# Patient Record
Sex: FEMALE | Race: WHITE | NOT HISPANIC OR LATINO | Employment: PART TIME | URBAN - METROPOLITAN AREA
[De-identification: names, ages, dates, MRNs, and addresses within clinical notes are randomized per-mention and may not be internally consistent; named-entity substitution may affect disease eponyms.]

---

## 2020-06-10 ENCOUNTER — TRANSCRIBE ORDERS (OUTPATIENT)
Dept: PERINATAL CARE | Facility: CLINIC | Age: 35
End: 2020-06-10

## 2020-06-10 DIAGNOSIS — O09.899 SUPERVISION OF OTHER HIGH RISK PREGNANCIES, UNSPECIFIED TRIMESTER: Primary | ICD-10-CM

## 2020-06-18 ENCOUNTER — TELEPHONE (OUTPATIENT)
Dept: PERINATAL CARE | Facility: CLINIC | Age: 35
End: 2020-06-18

## 2020-06-18 ENCOUNTER — TELEPHONE (OUTPATIENT)
Dept: PERINATAL CARE | Facility: OTHER | Age: 35
End: 2020-06-18

## 2020-07-20 RX ORDER — LORATADINE 10 MG/1
10 TABLET ORAL DAILY
COMMUNITY
End: 2020-09-23 | Stop reason: ALTCHOICE

## 2020-07-20 RX ORDER — NIFEDIPINE 30 MG/1
30 TABLET, FILM COATED, EXTENDED RELEASE ORAL DAILY
COMMUNITY
Start: 2020-06-11 | End: 2021-04-28 | Stop reason: ALTCHOICE

## 2020-07-20 RX ORDER — MAG HYDROX/ALUMINUM HYD/SIMETH 400-400-40
125 SUSPENSION, ORAL (FINAL DOSE FORM) ORAL DAILY
COMMUNITY

## 2020-07-21 ENCOUNTER — TELEPHONE (OUTPATIENT)
Dept: PERINATAL CARE | Facility: CLINIC | Age: 35
End: 2020-07-21

## 2020-07-21 NOTE — TELEPHONE ENCOUNTER
Attempted to reach patient by phone and left voicemail to confirm appointment for MFM ultrasound  1 support person ( must be over the age of 15) may accompany you for your appointment  If you or your support person have traveled outside the state in the past 2 weeks, please call and notify our office today #[phone]  You and your support person must wear a mask ,covering nose and mouth,during your entire visit  You and your support person will have temperature screened upon arrival     Please call our office prior to entering the building  Check in and rooming questions will be done via phone  We will give you directions when to enter for your appointment  Inside office # provided:  [facility] line: [phone]  [place] line:  [phone]  [place]  line:  [phone] line:  [phone]  [place]ri[place] Bear line:  [phone]  [place] line:  [phone]    [facility] does not allow cell phone use, recording device or streaming during ultrasound  IF you are not feeling well- cough, fever, shortness of breath or any flu like symptoms, contact your primary care physician or [address] [place]    Any questions with these instructions please call Maternal Fetal Medicine nurse line today @ # [phone]

## 2020-07-22 ENCOUNTER — ROUTINE PRENATAL (OUTPATIENT)
Dept: PERINATAL CARE | Facility: CLINIC | Age: 35
End: 2020-07-22
Payer: COMMERCIAL

## 2020-07-22 VITALS
SYSTOLIC BLOOD PRESSURE: 136 MMHG | HEART RATE: 85 BPM | HEIGHT: 68 IN | WEIGHT: 146 LBS | BODY MASS INDEX: 22.13 KG/M2 | TEMPERATURE: 99 F | DIASTOLIC BLOOD PRESSURE: 88 MMHG

## 2020-07-22 DIAGNOSIS — O10.912 CHRONIC HYPERTENSION IN OBSTETRIC CONTEXT IN SECOND TRIMESTER: Primary | ICD-10-CM

## 2020-07-22 DIAGNOSIS — O09.522 MULTIGRAVIDA OF ADVANCED MATERNAL AGE IN SECOND TRIMESTER: ICD-10-CM

## 2020-07-22 DIAGNOSIS — O09.899 SUPERVISION OF OTHER HIGH RISK PREGNANCIES, UNSPECIFIED TRIMESTER: ICD-10-CM

## 2020-07-22 DIAGNOSIS — O44.42 LOW LYING PLACENTA NOS OR WITHOUT HEMORRHAGE, SECOND TRIMESTER: ICD-10-CM

## 2020-07-22 DIAGNOSIS — Z3A.19 19 WEEKS GESTATION OF PREGNANCY: ICD-10-CM

## 2020-07-22 PROCEDURE — 76811 OB US DETAILED SNGL FETUS: CPT | Performed by: OBSTETRICS & GYNECOLOGY

## 2020-07-22 PROCEDURE — 99202 OFFICE O/P NEW SF 15 MIN: CPT | Performed by: OBSTETRICS & GYNECOLOGY

## 2020-07-22 PROCEDURE — 76817 TRANSVAGINAL US OBSTETRIC: CPT | Performed by: OBSTETRICS & GYNECOLOGY

## 2020-07-22 RX ORDER — ASPIRIN 81 MG/1
162 TABLET ORAL DAILY
Qty: 180 TABLET | Refills: 3 | Status: SHIPPED | OUTPATIENT
Start: 2020-07-22 | End: 2020-08-11 | Stop reason: ALTCHOICE

## 2020-07-22 NOTE — PROGRESS NOTES
The patient was seen today for an ultrasound  Please see ultrasound report (located under Ob Procedures) for additional details  Thank you very much for allowing us to participate in the care of this very nice patient  Should you have any questions, please do not hesitate to contact me  Brendon Robertson MD [address]  Attending Physician, Marzena

## 2020-07-22 NOTE — PROGRESS NOTES
A transvaginal ultrasound was performed  Sonographer note on use of High Level Disinfection Process (Trophon) for transvaginal probe# 2 used, serial # [id number]     Josephine Sandra, DESEANMS

## 2020-08-11 ENCOUNTER — ANESTHESIA EVENT (OUTPATIENT)
Dept: PERIOP | Facility: HOSPITAL | Age: 35
End: 2020-08-11
Payer: COMMERCIAL

## 2020-08-11 ENCOUNTER — TELEPHONE (OUTPATIENT)
Dept: PERINATAL CARE | Facility: CLINIC | Age: 35
End: 2020-08-11

## 2020-08-11 ENCOUNTER — PROCEDURE VISIT (OUTPATIENT)
Dept: PERINATAL CARE | Facility: OTHER | Age: 35
End: 2020-08-11
Payer: COMMERCIAL

## 2020-08-11 VITALS
DIASTOLIC BLOOD PRESSURE: 102 MMHG | HEART RATE: 69 BPM | BODY MASS INDEX: 21.95 KG/M2 | TEMPERATURE: 99.5 F | HEIGHT: 68 IN | SYSTOLIC BLOOD PRESSURE: 159 MMHG | WEIGHT: 144.84 LBS

## 2020-08-11 DIAGNOSIS — Z3A.22 22 WEEKS GESTATION OF PREGNANCY: ICD-10-CM

## 2020-08-11 DIAGNOSIS — O09.522 MULTIGRAVIDA OF ADVANCED MATERNAL AGE IN SECOND TRIMESTER: ICD-10-CM

## 2020-08-11 DIAGNOSIS — Z01.818 ENCOUNTER FOR PREOPERATIVE EXAMINATION FOR GENERAL SURGICAL PROCEDURE: ICD-10-CM

## 2020-08-11 DIAGNOSIS — O10.912 CHRONIC HYPERTENSION IN OBSTETRIC CONTEXT IN SECOND TRIMESTER: ICD-10-CM

## 2020-08-11 DIAGNOSIS — O36.4XX0 FETAL DEMISE, GREATER THAN 22 WEEKS, ANTEPARTUM, SINGLE OR UNSPECIFIED FETUS: Primary | ICD-10-CM

## 2020-08-11 DIAGNOSIS — O26.20 PREVIOUS RECURRENT MISCARRIAGES AFFECTING PREGNANCY, ANTEPARTUM: ICD-10-CM

## 2020-08-11 PROCEDURE — 59200 INSERT CERVICAL DILATOR: CPT | Performed by: OBSTETRICS & GYNECOLOGY

## 2020-08-11 PROCEDURE — 76816 OB US FOLLOW-UP PER FETUS: CPT | Performed by: OBSTETRICS & GYNECOLOGY

## 2020-08-11 PROCEDURE — 99024 POSTOP FOLLOW-UP VISIT: CPT | Performed by: OBSTETRICS & GYNECOLOGY

## 2020-08-11 RX ORDER — DOXYCYCLINE HYCLATE 100 MG/1
100 CAPSULE ORAL EVERY 12 HOURS SCHEDULED
Qty: 2 CAPSULE | Refills: 0 | Status: SHIPPED | OUTPATIENT
Start: 2020-08-11 | End: 2020-08-13 | Stop reason: HOSPADM

## 2020-08-11 RX ORDER — MISOPROSTOL 100 UG/1
800 TABLET ORAL ONCE
Status: CANCELLED | OUTPATIENT
Start: 2020-08-11

## 2020-08-11 NOTE — LETTER
2020     MD Jaiden Mercerja [address] [facility] [address]  [facility] [address]    Patient: Daron Fairbanks   YOB: 1985   Date of Visit: 2020       Dear Dr Jael Claire Md:    Thank you for referring Daron Fairbanks to me for evaluation  Below are my notes for this consultation  If you have questions, please do not hesitate to call me  I look forward to following your patient along with you  Sincerely,        Marylen Bryant, MD        CC: Leon Blumenthal, MD Marylen Bryant, MD  2020  7:52 PM  Incomplete  PREPROCEDURE H&P: MATERNAL-FETAL MEDICINE    Daron Fairbanks is a 29y o  year-old para [id number] at 22w4d here with chief complaint of preprocedure History and Physical Examination prior to planned laminaria today and D/E tomorrow  History of Present Illness as it relates to this planned procedure is that she was time diagnosed with an IUFD at 22 weeks and 4 days that was similar in size to the findings at her 19 week ultrasound  The timing of her demise is not known but looks recent  The fetus appeared edematous which is normal in a fetal demise  The cranial bones were not over lapping  The henry appears normal   The placenta is posterior  [place]   Her history is as follows:    Past Medical History:   Diagnosis Date    Abnormal Pap smear of cervix     ASC-H w/ +HPV    HTN (hypertension)      Past Surgical History:   Procedure Laterality Date    DILATION AND EVACUATION      15 week IUFD     OB History    Para Term  AB Living   4 2 2 0 1 2   SAB TAB Ectopic Multiple Live Births   1 0 0 0 2      # Outcome Date GA Lbr Andrés/2nd Weight Sex Delivery Anes PTL Lv   4 Current            3 SAB  14w2d             Birth Comments: missed ab and needed D/E   2 Term 16/17 37w0d  2381 g (5 lb 4 oz) M Vag-Spont  N CHRISTIANNE   1 Term 11/10/11 40w5d  3487 g (7 lb 11 oz) M Vag-Spont  N CHRISTIANNE      Obstetric Comments   IUFD @ 13 weeks Karyotype did not grow     Social History     Tobacco Use Smoking Status Never Smoker   Smokeless Tobacco Never Used      Social History     Substance and Sexual Activity   Alcohol Use Not Currently      Social History     Substance and Sexual Activity   Drug Use Never        Current Outpatient Medications:     NIFEdipine ER (ADALAT CC) 30 MG 24 hr tablet, Take 30 mg by mouth daily On empty stomach, Disp: , Rfl:     Prenatal Vit w/Ow-Dpollqdzx-KN (PNV PO), Take 1 tablet by mouth daily, Disp: , Rfl:     aspirin (ECOTRIN LOW STRENGTH) 81 mg EC tablet, Take 2 tablets (162 mg total) by mouth daily (Patient not taking: Reported on 8/11/2020), Disp: 180 tablet, Rfl: 3    Cholecalciferol (VITAMIN D3) 125 MCG (5000 UT) CAPS, Take 125 mcg by mouth daily, Disp: , Rfl:     doxycycline hyclate (VIBRAMYCIN) 100 mg capsule, Take 1 capsule (100 mg total) by mouth every 12 (twelve) hours for 2 doses, Disp: 2 capsule, Rfl: 0    loratadine (CLARITIN) 10 mg tablet, Take 10 mg by mouth daily, Disp: , Rfl:   Allergies   Allergen Reactions    Iodine Anaphylaxis    Doxycycline GI Intolerance       Examination:  Vitals: Blood pressure (!) 159/102, pulse 69, temperature 99 5 °F (37 5 °C), temperature source Tympanic, height 5' 8" (1 727 m), weight 65 7 kg (144 lb 13 5 oz), last menstrual period 02/28/2020  General appearance: alert, well appearing, and in no distress  Pulmonary exam: clear to auscultation, no wheezes, rales or rhonchi, symmetric air entry  Cardiovascular exam: normal rate, regular rhythm, normal S1, S2, no murmurs, rubs, clicks or gallops  Abdominal exam: soft, nontender, nondistended, no masses or organomegaly  Back no CVA tenderness  Extremity exam:no pedal edema noted  No varicose veins noted  Vagina showed normal discharge and no lesions  Cervix was multiparous mid position and no lesions      Relevant lab data:  Blood type: O   Rh type: +       5 large laminaria and 1 medium laminaria were placed along with a moistened sponge    Please see her laminaria procedure  Assessment and Plan: Ms Ari Dewey is a 29y o  year-old para [id number] here for preoperative History and Physical Examination prior to planned laminaria today and D/E tomorrow  Risks, benefits and alternatives to this procedure were reviewed with the patient  Today there are no obvious contraindications to planned procedure  Signed informed consent was obtained  Consent for POC disposal not completed yet as I did not have this form in my office today and will need completed tomorrow  She has contacted a  home and will bring that phone number and the  home name tomorrow and this information will be placed on her consent form  Her blood pressure was higher than normal today per her report  She feels that she is anxious  At the time of her preop procedure blood draw we have also added the following labs:  Torch titers  Parvovirus  Antiphospholipid antibodies- Depending on when she last took her baby aspirin the Lupus anticoagulant may return as abnormal and may need to be repeated  Screen for diabetes and thyroid disorder  Maternal karyotype  On POCs will order a microarray  Conner Joya and her  were given the diagnosis codes and the procedure codes for the microarray to make sure it is covered by their insurance  On her way home she will  a prescription for doxycycline 100 milligrams to be started as per the laminaria insert  She will take a dose today and tomorrow morning and then will be given 200 milligrams 1 hour prior to her D&E  I realized that she reports GI upset to doxycycline but she reports she has taken in the past for her last D&E without difficulty because it was not given for a prolonged period of time  At the conclusion of today's encounter, all questions were answered to her satisfaction  Thank you very much for this kind referral and please do not hesitate to contact me with any further questions or concerns      Sincerely,    Eunice Sharma, MD  Attending Physician, Cintia Meyers MD  8/11/2020  5:07 PM  Sign when Signing Visit  Procedure form    Date/Time: 8/11/2020 5:03 PM  Performed by: Tang Simmons MD  Authorized by: Tang Simmons MD     Patient location:  Bedside  Assisting Provider(s): Yes (comment)    Consent:     Consent obtained:  Written    Consent given by:  Patient    Risks discussed:  Bleeding, infection and pain  Indications:     Indications:  Pre-procedure dilation  Pre-procedure details:     Skin preparation:  Hibiclens  Anesthesia (see MAR for exact dosages): Anesthesia method:  None  Procedure Detail:     Equipment used:  Laminaria    Procedure note (site, laterality, method, findings):  Procedure:  Laminaria insertion procedure was explained to the patient and she gave verbal consent for the procedure  She was placed in the dorsal lithotomy position  A speculum was inserted with good visualization of the cervix  The cervix was cleansed with Hibiclens  The anterior lip of the cervix was grasped with a single-tooth tenaculum  Five #5 large laminaria and one #4 medium laminaria were inserted into the cervix without difficulty  Tenaculum was removed from the anterior lip of the cervix  One sterile saline soaked gauze sponges were placed into the vagina  The speculum was removed  The patient tolerated the procedure well  Post-procedure details:     Patient tolerance of procedure:   Tolerated well, no immediate complications  Comments:      Dr Gaines Rater present for entirety of the procedure      I have reviewed the notes, assessments, and/or procedures performed by Dr Sadia Durbin, I concur with her/his documentation of Bernadette Crowell MD

## 2020-08-11 NOTE — PATIENT INSTRUCTIONS
Pre Op Instructions for Patients having a Dilation and Evacuation  1  You will be set up for a visit 1-2 days before your scheduled procedure for placement of Laminaria and a moistened sponge  I will tell you how many Laminaria were placed  In some cases these may fall out prior to your procedure when you void or have a bowel movement  Please count them and verify for me that they have all fallen out including the sponge  Motrin or Advil over the counter can be taken for uterine cramping  Please follow directions found on the bottle  2  If Laminaria were placed, a script for antibiotics will be sent to the pharmacy of your choice to start after the Laminaria are placed and this will continue for a total of 1 days  You can pick this us prior to coming for the laminaria placement  3  You will be called by our Ambulatory Surgery Office the day prior to your surgery between the hours of 4 and 8:30 pm  They will confirm the time of your procedure and will tell you where and when to arrive prior to your surgery  They will tell you when you have to stop eating and drinking fluids  If you take any daily meds, please ask if you can take them with a sip of water the morning of your surgery  4  Please arrange to have someone drive you to and from your procedure and to be with you for 24 hrs  5  Please have any ordered labs drawn within 3 days of your surgery at the hospital where you will be having surgery  Diagnosis codes  Fetal demise O38  4XX0  Recurrent losses O26 20  Advanced maternal age O0 80  25 weeks Z3A 22

## 2020-08-11 NOTE — H&P
PREPROCEDURE H&P: MATERNAL-FETAL MEDICINE     Arsen Denis is a 29y o  year-old para [id number] at 22w4d here with chief complaint of preprocedure History and Physical Examination prior to planned laminaria today and D/E tomorrow  History of Present Illness as it relates to this planned procedure is that she was time diagnosed with an IUFD at 22 weeks and 4 days that was similar in size to the findings at her 19 week ultrasound  The timing of her demise is not known but looks recent  The fetus appeared edematous which is normal in a fetal demise  The cranial bones were not over lapping  The henry appears normal   The placenta is posterior   NIPT in this pregnancy was normal      Medical History        Past Medical History:   Diagnosis Date    Abnormal Pap smear of cervix       ASC-H w/ +HPV    HTN (hypertension)          Surgical History         Past Surgical History:   Procedure Laterality Date    DILATION AND EVACUATION         15 week IUFD                         OB History    Para Term  AB Living   4 2 2 0 1 2   SAB TAB Ectopic Multiple Live Births      1 0 0 0 2          # Outcome Date GA Lbr Andrés/2nd Weight Sex Delivery Anes PTL Lv   4 Current                     3 SAB  14w2d                    Birth Comments: missed ab and needed D/E   2 Term / 37w0d   2381 g (5 lb 4 oz) M Vag-Spont   N CHRISTIANNE   1 Term 11/10/11 40w5d   3487 g (7 lb 11 oz) M Vag-Spont   N CHRISTIANNE       Obstetric Comments   IUFD @ 13 weeks Karyotype did not grow      Social History          Tobacco Use   Smoking Status Never Smoker   Smokeless Tobacco Never Used      Social History      Substance and Sexual Activity   Alcohol Use Not Currently      Social History          Substance and Sexual Activity   Drug Use Never        Current Outpatient Medications:     NIFEdipine ER (ADALAT CC) 30 MG 24 hr tablet, Take 30 mg by mouth daily On empty stomach, Disp: , Rfl:     Prenatal Vit w/Id-Bnrnmqtyl-JG (PNV PO), Take 1 tablet by mouth daily, Disp: , Rfl:     aspirin (ECOTRIN LOW STRENGTH) 81 mg EC tablet, Take 2 tablets (162 mg total) by mouth daily (Patient not taking: Reported on 8/11/2020), Disp: 180 tablet, Rfl: 3    Cholecalciferol (VITAMIN D3) 125 MCG (5000 UT) CAPS, Take 125 mcg by mouth daily, Disp: , Rfl:     doxycycline hyclate (VIBRAMYCIN) 100 mg capsule, Take 1 capsule (100 mg total) by mouth every 12 (twelve) hours for 2 doses, Disp: 2 capsule, Rfl: 0    loratadine (CLARITIN) 10 mg tablet, Take 10 mg by mouth daily, Disp: , Rfl:        Allergies   Allergen Reactions    Iodine Anaphylaxis    Doxycycline GI Intolerance        Examination:  Vitals: Blood pressure (!) 159/102, pulse 69, temperature 99 5 °F (37 5 °C), temperature source Tympanic, height 5' 8" (1 727 m), weight 65 7 kg (144 lb 13 5 oz), last menstrual period 02/28/2020  General appearance: alert, well appearing, and in no distress  Pulmonary exam: clear to auscultation, no wheezes, rales or rhonchi, symmetric air entry  Cardiovascular exam: normal rate, regular rhythm, normal S1, S2, no murmurs, rubs, clicks or gallops  Abdominal exam: soft, nontender, nondistended, no masses or organomegaly  Back no CVA tenderness  Extremity exam:no pedal edema noted  No varicose veins noted  Vagina showed normal discharge and no lesions  Cervix was multiparous mid position and no lesions        Relevant lab data:  Blood type: O   Rh type: +       5 large laminaria and 1 medium laminaria were placed along with a moistened sponge  Please see her laminaria procedure       Assessment and Plan: Ms Karma Downey is a 29y o  year-old para [id number] here for preoperative History and Physical Examination prior to planned laminaria today and D/E tomorrow  Risks, benefits and alternatives to this procedure were reviewed with the patient  Today there are no obvious contraindications to planned procedure  Signed informed consent was obtained    Consent for POC disposal not completed yet as I did not have this form in my office today and will need completed tomorrow  She has contacted a  home and will bring that phone number and the  home name tomorrow and this information will be placed on her consent form  Her blood pressure was higher than normal today per her report  She feels that she is anxious       At the time of her preop procedure blood draw we have also added the following labs:  Torch titers  Parvovirus  Antiphospholipid antibodies- Depending on when she last took her baby aspirin the Lupus anticoagulant may return as abnormal and may need to be repeated  Screen for diabetes and thyroid disorder  Maternal karyotype  On POCs will order a microarray  Jorge A Hagan and her  were given the diagnosis codes and the procedure codes for the microarray to make sure it is covered by their insurance  On her way home she will  a prescription for doxycycline 100 milligrams to be started as per the laminaria insert  She will take a dose today and tomorrow morning and then will be given 200 milligrams 1 hour prior to her D&E  I realized that she reports GI upset to doxycycline but she reports she has taken in the past for her last D&E without difficulty because it was not given for a prolonged period of time       At the conclusion of today's encounter, all questions were answered to her satisfaction    Thank you very much for this kind referral and please do not hesitate to contact me with any further questions or concerns      Sincerely,     Bridgett Gan MD  Attending Physician, Marzena

## 2020-08-11 NOTE — Clinical Note
Please call patient to set up 1 month visit to go over results of her labs being completed tomorrow during her d/e for a missed ab  Please make appt at [facility] with me

## 2020-08-11 NOTE — PROGRESS NOTES
Pre procedure time out :   Verified patient name and   Confirmed procedure to be performed  Reviewed relevant allergies-doxycycline, Iodine  Reviewed maternal medication list     Patient tolerated procedure without difficulty  Dr Iván Ruby verbally reviewed with patient post  instructions   Patient verbalized understanding of all instructions given     Time out participants:   Dr Iván Garcia

## 2020-08-11 NOTE — TELEPHONE ENCOUNTER
Spoke to Colonel Ramon to confirm that she will be seen today 8/11/2020 at 3:45 PM in our [facility] office for insertion of Laminaria with Dr Lit Hahn  She is schedule in the main [facility] OR on 8/12/20 with Dr Filiberto Harris as an end of day add on and Ambulatory surgery will be contacting her with further instructions per Forest Bansal in [address] scheduling  Patient verbalized understanding of today's appointment in our [facility] Fetal Medicine Office and tomorrow in the main OR in [facility]

## 2020-08-11 NOTE — PROGRESS NOTES
PREPROCEDURE H&P: MATERNAL-FETAL MEDICINE    Sagrario Vargas is a 29y o  year-old para [id number] at 22w4d here with chief complaint of preprocedure History and Physical Examination prior to planned laminaria today and D/E tomorrow  History of Present Illness as it relates to this planned procedure is that she was time diagnosed with an IUFD at 22 weeks and 4 days that was similar in size to the findings at her 19 week ultrasound  The timing of her demise is not known but looks recent  The fetus appeared edematous which is normal in a fetal demise  The cranial bones were not over lapping  The henry appears normal   The placenta is posterior  Lajean Cassette   Her history is as follows:    Past Medical History:   Diagnosis Date    Abnormal Pap smear of cervix     ASC-H w/ +HPV    HTN (hypertension)      Past Surgical History:   Procedure Laterality Date    DILATION AND EVACUATION      15 week IUFD     OB History    Para Term  AB Living   4 2 2 0 1 2   SAB TAB Ectopic Multiple Live Births   1 0 0 0 2      # Outcome Date GA Lbr Andrés/2nd Weight Sex Delivery Anes PTL Lv   4 Current            3 SAB  14w2d             Birth Comments: missed ab and needed D/E   2 Term / 37w0d  2381 g (5 lb 4 oz) M Vag-Spont  N CHRISTIANNE   1 Term 11/10/11 40w5d  3487 g (7 lb 11 oz) M Vag-Spont  N CHRISTIANNE      Obstetric Comments   IUFD @ 13 weeks Karyotype did not grow     Social History     Tobacco Use   Smoking Status Never Smoker   Smokeless Tobacco Never Used      Social History     Substance and Sexual Activity   Alcohol Use Not Currently      Social History     Substance and Sexual Activity   Drug Use Never        Current Outpatient Medications:     NIFEdipine ER (ADALAT CC) 30 MG 24 hr tablet, Take 30 mg by mouth daily On empty stomach, Disp: , Rfl:     Prenatal Vit w/Rk-Psjejzwtv-YD (PNV PO), Take 1 tablet by mouth daily, Disp: , Rfl:     aspirin (ECOTRIN LOW STRENGTH) 81 mg EC tablet, Take 2 tablets (162 mg total) by mouth daily (Patient not taking: Reported on 2020), Disp: 180 tablet, Rfl: 3    Cholecalciferol (VITAMIN D3) 125 MCG (5000 UT) CAPS, Take 125 mcg by mouth daily, Disp: , Rfl:     doxycycline hyclate (VIBRAMYCIN) 100 mg capsule, Take 1 capsule (100 mg total) by mouth every 12 (twelve) hours for 2 doses, Disp: 2 capsule, Rfl: 0    loratadine (CLARITIN) 10 mg tablet, Take 10 mg by mouth daily, Disp: , Rfl:   Allergies   Allergen Reactions    Iodine Anaphylaxis    Doxycycline GI Intolerance       Examination:  Vitals: Blood pressure (!) 159/102, pulse 69, temperature 99 5 °F (37 5 °C), temperature source Tympanic, height 5' 8" (1 727 m), weight 65 7 kg (144 lb 13 5 oz), last menstrual period 2020  General appearance: alert, well appearing, and in no distress  Pulmonary exam: clear to auscultation, no wheezes, rales or rhonchi, symmetric air entry  Cardiovascular exam: normal rate, regular rhythm, normal S1, S2, no murmurs, rubs, clicks or gallops  Abdominal exam: soft, nontender, nondistended, no masses or organomegaly  Back no CVA tenderness  Extremity exam:no pedal edema noted  No varicose veins noted  Vagina showed normal discharge and no lesions  Cervix was multiparous mid position and no lesions      Relevant lab data:  Blood type: O   Rh type: +       5 large laminaria and 1 medium laminaria were placed along with a moistened sponge  Please see her laminaria procedure  Assessment and Plan: Ms Lon Tejeda is a 29y o  year-old para [id number] here for preoperative History and Physical Examination prior to planned laminaria today and D/E tomorrow  Risks, benefits and alternatives to this procedure were reviewed with the patient  Today there are no obvious contraindications to planned procedure  Signed informed consent was obtained  Consent for POC disposal not completed yet as I did not have this form in my office today and will need completed tomorrow    She has contacted a  home and will bring that phone number and the  home name tomorrow and this information will be placed on her consent form  Her blood pressure was higher than normal today per her report  She feels that she is anxious  At the time of her preop procedure blood draw we have also added the following labs:  Torch titers  Parvovirus  Antiphospholipid antibodies- Depending on when she last took her baby aspirin the Lupus anticoagulant may return as abnormal and may need to be repeated  Screen for diabetes and thyroid disorder  Maternal karyotype  On POCs will order a microarray  Jorge A Hagan and her  were given the diagnosis codes and the procedure codes for the microarray to make sure it is covered by their insurance  On her way home she will  a prescription for doxycycline 100 milligrams to be started as per the laminaria insert  She will take a dose today and tomorrow morning and then will be given 200 milligrams 1 hour prior to her D&E  I realized that she reports GI upset to doxycycline but she reports she has taken in the past for her last D&E without difficulty because it was not given for a prolonged period of time  At the conclusion of today's encounter, all questions were answered to her satisfaction  Thank you very much for this kind referral and please do not hesitate to contact me with any further questions or concerns      Sincerely,    Bridgett Gan MD  Attending Physician, Marzena

## 2020-08-11 NOTE — PROGRESS NOTES
Procedure form    Date/Time: 8/11/2020 5:03 PM  Performed by: Zafar Clay MD  Authorized by: Zafar Clay MD     Patient location:  Bedside  Assisting Provider(s): Yes (comment)    Consent:     Consent obtained:  Written    Consent given by:  Patient    Risks discussed:  Bleeding, infection and pain  Indications:     Indications:  Pre-procedure dilation  Pre-procedure details:     Skin preparation:  Hibiclens  Anesthesia (see MAR for exact dosages): Anesthesia method:  None  Procedure Detail:     Equipment used:  Laminaria    Procedure note (site, laterality, method, findings):  Procedure:  Laminaria insertion procedure was explained to the patient and she gave verbal consent for the procedure  She was placed in the dorsal lithotomy position  A speculum was inserted with good visualization of the cervix  The cervix was cleansed with Hibiclens  The anterior lip of the cervix was grasped with a single-tooth tenaculum  Five #5 large laminaria and one #4 medium laminaria were inserted into the cervix without difficulty  Tenaculum was removed from the anterior lip of the cervix  One sterile saline soaked gauze sponges were placed into the vagina  The speculum was removed  The patient tolerated the procedure well  Post-procedure details:     Patient tolerance of procedure:   Tolerated well, no immediate complications  Comments:      Dr Filiberto Harris present for entirety of the procedure      I have reviewed the notes, assessments, and/or procedures performed by Dr Adelaida Hodge, I concur with her/his documentation of Yuan Shepherd MD

## 2020-08-12 ENCOUNTER — ANESTHESIA (OUTPATIENT)
Dept: PERIOP | Facility: HOSPITAL | Age: 35
End: 2020-08-12
Payer: COMMERCIAL

## 2020-08-12 ENCOUNTER — HOSPITAL ENCOUNTER (OUTPATIENT)
Facility: HOSPITAL | Age: 35
Setting detail: OUTPATIENT SURGERY
Discharge: HOME | End: 2020-08-13
Attending: OBSTETRICS & GYNECOLOGY | Admitting: OBSTETRICS & GYNECOLOGY
Payer: COMMERCIAL

## 2020-08-12 ENCOUNTER — DOCUMENTATION (OUTPATIENT)
Dept: PERINATAL CARE | Facility: CLINIC | Age: 35
End: 2020-08-12

## 2020-08-12 DIAGNOSIS — E87.6 HYPOKALEMIA: ICD-10-CM

## 2020-08-12 DIAGNOSIS — O26.20 PREVIOUS RECURRENT MISCARRIAGES AFFECTING PREGNANCY, ANTEPARTUM: ICD-10-CM

## 2020-08-12 DIAGNOSIS — Z3A.22 22 WEEKS GESTATION OF PREGNANCY: ICD-10-CM

## 2020-08-12 DIAGNOSIS — O36.4XX0 FETAL DEMISE BEFORE 22 WEEKS WITH RETENTION OF DEAD FETUS: ICD-10-CM

## 2020-08-12 DIAGNOSIS — O36.4XX0: ICD-10-CM

## 2020-08-12 DIAGNOSIS — O36.4XX0 FETAL DEMISE, GREATER THAN 22 WEEKS, ANTEPARTUM: Primary | ICD-10-CM

## 2020-08-12 DIAGNOSIS — O09.522 MULTIGRAVIDA OF ADVANCED MATERNAL AGE IN SECOND TRIMESTER: ICD-10-CM

## 2020-08-12 DIAGNOSIS — O36.4XX0 FETAL DEMISE, GREATER THAN 22 WEEKS, ANTEPARTUM, SINGLE OR UNSPECIFIED FETUS: ICD-10-CM

## 2020-08-12 LAB
ABO GROUP BLD: NORMAL
ABO GROUP BLD: NORMAL
ALBUMIN SERPL BCP-MCNC: 3.4 G/DL (ref 3.5–5)
ALP SERPL-CCNC: 95 U/L (ref 46–116)
ALT SERPL W P-5'-P-CCNC: 18 U/L (ref 12–78)
ANION GAP SERPL CALCULATED.3IONS-SCNC: 8 MMOL/L (ref 4–13)
AST SERPL W P-5'-P-CCNC: 19 U/L (ref 5–45)
BASOPHILS # BLD AUTO: 0.04 THOUSANDS/ΜL (ref 0–0.1)
BASOPHILS NFR BLD AUTO: 0 % (ref 0–1)
BILIRUB SERPL-MCNC: 1.07 MG/DL (ref 0.2–1)
BLD GP AB SCN SERPL QL: NEGATIVE
BUN SERPL-MCNC: 6 MG/DL (ref 5–25)
CALCIUM SERPL-MCNC: 8.7 MG/DL (ref 8.3–10.1)
CHLORIDE SERPL-SCNC: 107 MMOL/L (ref 100–108)
CO2 SERPL-SCNC: 23 MMOL/L (ref 21–32)
CREAT SERPL-MCNC: 0.54 MG/DL (ref 0.6–1.3)
EOSINOPHIL # BLD AUTO: 0.07 THOUSAND/ΜL (ref 0–0.61)
EOSINOPHIL NFR BLD AUTO: 1 % (ref 0–6)
ERYTHROCYTE [DISTWIDTH] IN BLOOD BY AUTOMATED COUNT: 12.5 % (ref 11.6–15.1)
EST. AVERAGE GLUCOSE BLD GHB EST-MCNC: 97 MG/DL
GFR SERPL CREATININE-BSD FRML MDRD: 124 ML/MIN/1.73SQ M
GLUCOSE SERPL-MCNC: 78 MG/DL (ref 65–140)
HBA1C MFR BLD: 5 %
HCT VFR BLD AUTO: 36.9 % (ref 34.8–46.1)
HGB BLD-MCNC: 12.6 G/DL (ref 11.5–15.4)
IMM GRANULOCYTES # BLD AUTO: 0.05 THOUSAND/UL (ref 0–0.2)
IMM GRANULOCYTES NFR BLD AUTO: 0 % (ref 0–2)
LDH SERPL-CCNC: 249 U/L (ref 81–234)
LYMPHOCYTES # BLD AUTO: 1.92 THOUSANDS/ΜL (ref 0.6–4.47)
LYMPHOCYTES NFR BLD AUTO: 16 % (ref 14–44)
MCH RBC QN AUTO: 32.7 PG (ref 26.8–34.3)
MCHC RBC AUTO-ENTMCNC: 34.1 G/DL (ref 31.4–37.4)
MCV RBC AUTO: 96 FL (ref 82–98)
MONOCYTES # BLD AUTO: 0.78 THOUSAND/ΜL (ref 0.17–1.22)
MONOCYTES NFR BLD AUTO: 6 % (ref 4–12)
NEUTROPHILS # BLD AUTO: 9.55 THOUSANDS/ΜL (ref 1.85–7.62)
NEUTS SEG NFR BLD AUTO: 77 % (ref 43–75)
NRBC BLD AUTO-RTO: 0 /100 WBCS
PLATELET # BLD AUTO: 248 THOUSANDS/UL (ref 149–390)
PMV BLD AUTO: 11 FL (ref 8.9–12.7)
POTASSIUM SERPL-SCNC: 3 MMOL/L (ref 3.5–5.3)
PROT SERPL-MCNC: 7.5 G/DL (ref 6.4–8.2)
RBC # BLD AUTO: 3.85 MILLION/UL (ref 3.81–5.12)
RH BLD: POSITIVE
RH BLD: POSITIVE
SODIUM SERPL-SCNC: 138 MMOL/L (ref 136–145)
SPECIMEN EXPIRATION DATE: NORMAL
TSH SERPL DL<=0.05 MIU/L-ACNC: 2.58 UIU/ML (ref 0.36–3.74)
URATE SERPL-MCNC: 2.7 MG/DL (ref 2–6.8)
WBC # BLD AUTO: 12.41 THOUSAND/UL (ref 4.31–10.16)

## 2020-08-12 PROCEDURE — 86850 RBC ANTIBODY SCREEN: CPT | Performed by: OBSTETRICS & GYNECOLOGY

## 2020-08-12 PROCEDURE — 86901 BLOOD TYPING SEROLOGIC RH(D): CPT | Performed by: OBSTETRICS & GYNECOLOGY

## 2020-08-12 PROCEDURE — 86695 HERPES SIMPLEX TYPE 1 TEST: CPT | Performed by: STUDENT IN AN ORGANIZED HEALTH CARE EDUCATION/TRAINING PROGRAM

## 2020-08-12 PROCEDURE — 88230 TISSUE CULTURE LYMPHOCYTE: CPT | Performed by: STUDENT IN AN ORGANIZED HEALTH CARE EDUCATION/TRAINING PROGRAM

## 2020-08-12 PROCEDURE — 84443 ASSAY THYROID STIM HORMONE: CPT | Performed by: STUDENT IN AN ORGANIZED HEALTH CARE EDUCATION/TRAINING PROGRAM

## 2020-08-12 PROCEDURE — 85705 THROMBOPLASTIN INHIBITION: CPT | Performed by: STUDENT IN AN ORGANIZED HEALTH CARE EDUCATION/TRAINING PROGRAM

## 2020-08-12 PROCEDURE — 86762 RUBELLA ANTIBODY: CPT | Performed by: STUDENT IN AN ORGANIZED HEALTH CARE EDUCATION/TRAINING PROGRAM

## 2020-08-12 PROCEDURE — 83036 HEMOGLOBIN GLYCOSYLATED A1C: CPT | Performed by: STUDENT IN AN ORGANIZED HEALTH CARE EDUCATION/TRAINING PROGRAM

## 2020-08-12 PROCEDURE — 88262 CHROMOSOME ANALYSIS 15-20: CPT | Performed by: STUDENT IN AN ORGANIZED HEALTH CARE EDUCATION/TRAINING PROGRAM

## 2020-08-12 PROCEDURE — 86146 BETA-2 GLYCOPROTEIN ANTIBODY: CPT | Performed by: STUDENT IN AN ORGANIZED HEALTH CARE EDUCATION/TRAINING PROGRAM

## 2020-08-12 PROCEDURE — NC001 PR NO CHARGE: Performed by: OBSTETRICS & GYNECOLOGY

## 2020-08-12 PROCEDURE — 83735 ASSAY OF MAGNESIUM: CPT | Performed by: OBSTETRICS & GYNECOLOGY

## 2020-08-12 PROCEDURE — 85025 COMPLETE CBC W/AUTO DIFF WBC: CPT

## 2020-08-12 PROCEDURE — 86900 BLOOD TYPING SEROLOGIC ABO: CPT | Performed by: OBSTETRICS & GYNECOLOGY

## 2020-08-12 PROCEDURE — 85732 THROMBOPLASTIN TIME PARTIAL: CPT | Performed by: STUDENT IN AN ORGANIZED HEALTH CARE EDUCATION/TRAINING PROGRAM

## 2020-08-12 PROCEDURE — 84156 ASSAY OF PROTEIN URINE: CPT | Performed by: OBSTETRICS & GYNECOLOGY

## 2020-08-12 PROCEDURE — 86778 TOXOPLASMA ANTIBODY IGM: CPT | Performed by: STUDENT IN AN ORGANIZED HEALTH CARE EDUCATION/TRAINING PROGRAM

## 2020-08-12 PROCEDURE — 86147 CARDIOLIPIN ANTIBODY EA IG: CPT | Performed by: STUDENT IN AN ORGANIZED HEALTH CARE EDUCATION/TRAINING PROGRAM

## 2020-08-12 PROCEDURE — 86747 PARVOVIRUS ANTIBODY: CPT | Performed by: OBSTETRICS & GYNECOLOGY

## 2020-08-12 PROCEDURE — 85613 RUSSELL VIPER VENOM DILUTED: CPT | Performed by: STUDENT IN AN ORGANIZED HEALTH CARE EDUCATION/TRAINING PROGRAM

## 2020-08-12 PROCEDURE — 86696 HERPES SIMPLEX TYPE 2 TEST: CPT | Performed by: STUDENT IN AN ORGANIZED HEALTH CARE EDUCATION/TRAINING PROGRAM

## 2020-08-12 PROCEDURE — 99024 POSTOP FOLLOW-UP VISIT: CPT | Performed by: OBSTETRICS & GYNECOLOGY

## 2020-08-12 PROCEDURE — 86645 CMV ANTIBODY IGM: CPT | Performed by: STUDENT IN AN ORGANIZED HEALTH CARE EDUCATION/TRAINING PROGRAM

## 2020-08-12 PROCEDURE — 82570 ASSAY OF URINE CREATININE: CPT | Performed by: OBSTETRICS & GYNECOLOGY

## 2020-08-12 PROCEDURE — 80053 COMPREHEN METABOLIC PANEL: CPT | Performed by: OBSTETRICS & GYNECOLOGY

## 2020-08-12 PROCEDURE — 59821 TREATMENT OF MISCARRIAGE: CPT | Performed by: OBSTETRICS & GYNECOLOGY

## 2020-08-12 PROCEDURE — 84132 ASSAY OF SERUM POTASSIUM: CPT | Performed by: OBSTETRICS & GYNECOLOGY

## 2020-08-12 PROCEDURE — 84550 ASSAY OF BLOOD/URIC ACID: CPT | Performed by: OBSTETRICS & GYNECOLOGY

## 2020-08-12 PROCEDURE — 85670 THROMBIN TIME PLASMA: CPT | Performed by: STUDENT IN AN ORGANIZED HEALTH CARE EDUCATION/TRAINING PROGRAM

## 2020-08-12 PROCEDURE — 83615 LACTATE (LD) (LDH) ENZYME: CPT | Performed by: OBSTETRICS & GYNECOLOGY

## 2020-08-12 RX ORDER — OXYCODONE HYDROCHLORIDE 5 MG/1
5 TABLET ORAL EVERY 4 HOURS PRN
Status: DISCONTINUED | OUTPATIENT
Start: 2020-08-12 | End: 2020-08-13 | Stop reason: HOSPADM

## 2020-08-12 RX ORDER — MISOPROSTOL 200 UG/1
800 TABLET ORAL ONCE
Status: COMPLETED | OUTPATIENT
Start: 2020-08-12 | End: 2020-08-12

## 2020-08-12 RX ORDER — ONDANSETRON 2 MG/ML
4 INJECTION INTRAMUSCULAR; INTRAVENOUS ONCE AS NEEDED
Status: DISCONTINUED | OUTPATIENT
Start: 2020-08-12 | End: 2020-08-12 | Stop reason: HOSPADM

## 2020-08-12 RX ORDER — LIDOCAINE HYDROCHLORIDE 10 MG/ML
INJECTION, SOLUTION EPIDURAL; INFILTRATION; INTRACAUDAL; PERINEURAL AS NEEDED
Status: DISCONTINUED | OUTPATIENT
Start: 2020-08-12 | End: 2020-08-12

## 2020-08-12 RX ORDER — PROMETHAZINE HYDROCHLORIDE 25 MG/ML
25 INJECTION, SOLUTION INTRAMUSCULAR; INTRAVENOUS ONCE AS NEEDED
Status: DISCONTINUED | OUTPATIENT
Start: 2020-08-12 | End: 2020-08-12 | Stop reason: HOSPADM

## 2020-08-12 RX ORDER — ONDANSETRON 2 MG/ML
4 INJECTION INTRAMUSCULAR; INTRAVENOUS EVERY 6 HOURS PRN
Status: DISCONTINUED | OUTPATIENT
Start: 2020-08-12 | End: 2020-08-13 | Stop reason: HOSPADM

## 2020-08-12 RX ORDER — SUCCINYLCHOLINE/SOD CL,ISO/PF 100 MG/5ML
SYRINGE (ML) INTRAVENOUS AS NEEDED
Status: DISCONTINUED | OUTPATIENT
Start: 2020-08-12 | End: 2020-08-12

## 2020-08-12 RX ORDER — ONDANSETRON 2 MG/ML
INJECTION INTRAMUSCULAR; INTRAVENOUS AS NEEDED
Status: DISCONTINUED | OUTPATIENT
Start: 2020-08-12 | End: 2020-08-12

## 2020-08-12 RX ORDER — OXYCODONE HYDROCHLORIDE 10 MG/1
10 TABLET ORAL EVERY 4 HOURS PRN
Status: DISCONTINUED | OUTPATIENT
Start: 2020-08-12 | End: 2020-08-13 | Stop reason: HOSPADM

## 2020-08-12 RX ORDER — NIFEDIPINE 30 MG/1
30 TABLET, EXTENDED RELEASE ORAL ONCE
Status: COMPLETED | OUTPATIENT
Start: 2020-08-12 | End: 2020-08-12

## 2020-08-12 RX ORDER — HYDROMORPHONE HCL/PF 1 MG/ML
0.2 SYRINGE (ML) INJECTION
Status: DISCONTINUED | OUTPATIENT
Start: 2020-08-12 | End: 2020-08-12 | Stop reason: HOSPADM

## 2020-08-12 RX ORDER — PANTOPRAZOLE SODIUM 40 MG/1
40 TABLET, DELAYED RELEASE ORAL DAILY
Status: DISCONTINUED | OUTPATIENT
Start: 2020-08-13 | End: 2020-08-13 | Stop reason: HOSPADM

## 2020-08-12 RX ORDER — DOXYCYCLINE HYCLATE 100 MG/1
200 CAPSULE ORAL ONCE
Status: COMPLETED | OUTPATIENT
Start: 2020-08-12 | End: 2020-08-12

## 2020-08-12 RX ORDER — OXYTOCIN/RINGER'S LACTATE 30/500 ML
40 PLASTIC BAG, INJECTION (ML) INTRAVENOUS
Status: DISCONTINUED | OUTPATIENT
Start: 2020-08-12 | End: 2020-08-12

## 2020-08-12 RX ORDER — KETOROLAC TROMETHAMINE 30 MG/ML
30 INJECTION, SOLUTION INTRAMUSCULAR; INTRAVENOUS ONCE
Status: COMPLETED | OUTPATIENT
Start: 2020-08-12 | End: 2020-08-12

## 2020-08-12 RX ORDER — HYDRALAZINE HYDROCHLORIDE 20 MG/ML
10 INJECTION INTRAMUSCULAR; INTRAVENOUS
Status: COMPLETED | OUTPATIENT
Start: 2020-08-12 | End: 2020-08-12

## 2020-08-12 RX ORDER — DEXAMETHASONE SODIUM PHOSPHATE 10 MG/ML
INJECTION, SOLUTION INTRAMUSCULAR; INTRAVENOUS AS NEEDED
Status: DISCONTINUED | OUTPATIENT
Start: 2020-08-12 | End: 2020-08-12

## 2020-08-12 RX ORDER — CEPHALEXIN 500 MG/1
500 CAPSULE ORAL EVERY 12 HOURS SCHEDULED
Qty: 14 CAPSULE | Refills: 0 | Status: SHIPPED | OUTPATIENT
Start: 2020-08-12 | End: 2020-08-19

## 2020-08-12 RX ORDER — PROPOFOL 10 MG/ML
INJECTION, EMULSION INTRAVENOUS AS NEEDED
Status: DISCONTINUED | OUTPATIENT
Start: 2020-08-12 | End: 2020-08-12

## 2020-08-12 RX ORDER — OXYTOCIN 10 [USP'U]/ML
40 INJECTION, SOLUTION INTRAMUSCULAR; INTRAVENOUS ONCE AS NEEDED
Status: COMPLETED | OUTPATIENT
Start: 2020-08-12 | End: 2020-08-12

## 2020-08-12 RX ORDER — SODIUM CHLORIDE, SODIUM LACTATE, POTASSIUM CHLORIDE, CALCIUM CHLORIDE 600; 310; 30; 20 MG/100ML; MG/100ML; MG/100ML; MG/100ML
100 INJECTION, SOLUTION INTRAVENOUS CONTINUOUS
Status: DISPENSED | OUTPATIENT
Start: 2020-08-12 | End: 2020-08-13

## 2020-08-12 RX ORDER — CEPHALEXIN 500 MG/1
500 CAPSULE ORAL ONCE
Status: COMPLETED | OUTPATIENT
Start: 2020-08-12 | End: 2020-08-12

## 2020-08-12 RX ORDER — MIDAZOLAM HYDROCHLORIDE 2 MG/2ML
INJECTION, SOLUTION INTRAMUSCULAR; INTRAVENOUS AS NEEDED
Status: DISCONTINUED | OUTPATIENT
Start: 2020-08-12 | End: 2020-08-12

## 2020-08-12 RX ORDER — DOXYCYCLINE HYCLATE 200 MG/1
TABLET, DELAYED RELEASE ORAL
Qty: 1 TABLET | Refills: 0
Start: 2020-08-12 | End: 2020-08-12 | Stop reason: CLARIF

## 2020-08-12 RX ORDER — SODIUM CHLORIDE, SODIUM LACTATE, POTASSIUM CHLORIDE, CALCIUM CHLORIDE 600; 310; 30; 20 MG/100ML; MG/100ML; MG/100ML; MG/100ML
INJECTION, SOLUTION INTRAVENOUS CONTINUOUS PRN
Status: DISCONTINUED | OUTPATIENT
Start: 2020-08-12 | End: 2020-08-12

## 2020-08-12 RX ORDER — METRONIDAZOLE 500 MG/1
500 TABLET ORAL EVERY 12 HOURS SCHEDULED
Qty: 14 TABLET | Refills: 0 | Status: SHIPPED | OUTPATIENT
Start: 2020-08-12 | End: 2020-08-19

## 2020-08-12 RX ORDER — FENTANYL CITRATE/PF 50 MCG/ML
25 SYRINGE (ML) INJECTION
Status: DISCONTINUED | OUTPATIENT
Start: 2020-08-12 | End: 2020-08-12 | Stop reason: HOSPADM

## 2020-08-12 RX ORDER — IBUPROFEN 600 MG/1
600 TABLET ORAL EVERY 6 HOURS PRN
Status: DISCONTINUED | OUTPATIENT
Start: 2020-08-12 | End: 2020-08-13 | Stop reason: HOSPADM

## 2020-08-12 RX ORDER — FENTANYL CITRATE 50 UG/ML
INJECTION, SOLUTION INTRAMUSCULAR; INTRAVENOUS AS NEEDED
Status: DISCONTINUED | OUTPATIENT
Start: 2020-08-12 | End: 2020-08-12

## 2020-08-12 RX ORDER — SODIUM CHLORIDE, SODIUM LACTATE, POTASSIUM CHLORIDE, CALCIUM CHLORIDE 600; 310; 30; 20 MG/100ML; MG/100ML; MG/100ML; MG/100ML
125 INJECTION, SOLUTION INTRAVENOUS CONTINUOUS
Status: DISCONTINUED | OUTPATIENT
Start: 2020-08-12 | End: 2020-08-13 | Stop reason: HOSPADM

## 2020-08-12 RX ORDER — LABETALOL 20 MG/4 ML (5 MG/ML) INTRAVENOUS SYRINGE
10
Status: DISCONTINUED | OUTPATIENT
Start: 2020-08-12 | End: 2020-08-12 | Stop reason: HOSPADM

## 2020-08-12 RX ORDER — METRONIDAZOLE 500 MG/1
500 TABLET ORAL ONCE
Status: COMPLETED | OUTPATIENT
Start: 2020-08-12 | End: 2020-08-12

## 2020-08-12 RX ADMIN — MIDAZOLAM 2 MG: 1 INJECTION INTRAMUSCULAR; INTRAVENOUS at 18:27

## 2020-08-12 RX ADMIN — SODIUM CHLORIDE, SODIUM LACTATE, POTASSIUM CHLORIDE, AND CALCIUM CHLORIDE 100 ML/HR: .6; .31; .03; .02 INJECTION, SOLUTION INTRAVENOUS at 20:56

## 2020-08-12 RX ADMIN — HYDRALAZINE HYDROCHLORIDE 10 MG: 20 INJECTION INTRAMUSCULAR; INTRAVENOUS at 20:35

## 2020-08-12 RX ADMIN — ONDANSETRON 4 MG: 2 INJECTION INTRAMUSCULAR; INTRAVENOUS at 18:38

## 2020-08-12 RX ADMIN — OXYTOCIN 40 UNITS: 10 INJECTION INTRAVENOUS at 19:09

## 2020-08-12 RX ADMIN — FENTANYL CITRATE 100 MCG: 50 INJECTION, SOLUTION INTRAMUSCULAR; INTRAVENOUS at 18:32

## 2020-08-12 RX ADMIN — LIDOCAINE HYDROCHLORIDE 50 MG: 10 INJECTION, SOLUTION EPIDURAL; INFILTRATION; INTRACAUDAL; PERINEURAL at 18:32

## 2020-08-12 RX ADMIN — DEXAMETHASONE SODIUM PHOSPHATE 10 MG: 10 INJECTION, SOLUTION INTRAMUSCULAR; INTRAVENOUS at 18:38

## 2020-08-12 RX ADMIN — DOXYCYCLINE 200 MG: 100 CAPSULE ORAL at 17:44

## 2020-08-12 RX ADMIN — HYDRALAZINE HYDROCHLORIDE 10 MG: 20 INJECTION INTRAMUSCULAR; INTRAVENOUS at 20:56

## 2020-08-12 RX ADMIN — KETOROLAC TROMETHAMINE 30 MG: 30 INJECTION, SOLUTION INTRAMUSCULAR at 19:46

## 2020-08-12 RX ADMIN — PROPOFOL 200 MG: 10 INJECTION, EMULSION INTRAVENOUS at 18:32

## 2020-08-12 RX ADMIN — Medication 100 MG: at 18:32

## 2020-08-12 RX ADMIN — METRONIDAZOLE 500 MG: 500 TABLET ORAL at 21:23

## 2020-08-12 RX ADMIN — SODIUM CHLORIDE, SODIUM LACTATE, POTASSIUM CHLORIDE, AND CALCIUM CHLORIDE: .6; .31; .03; .02 INJECTION, SOLUTION INTRAVENOUS at 18:29

## 2020-08-12 RX ADMIN — MISOPROSTOL 800 MCG: 200 TABLET ORAL at 16:59

## 2020-08-12 RX ADMIN — NIFEDIPINE 30 MG: 30 TABLET, FILM COATED, EXTENDED RELEASE ORAL at 23:25

## 2020-08-12 RX ADMIN — SODIUM CHLORIDE, SODIUM LACTATE, POTASSIUM CHLORIDE, AND CALCIUM CHLORIDE 125 ML/HR: .6; .31; .03; .02 INJECTION, SOLUTION INTRAVENOUS at 16:20

## 2020-08-12 RX ADMIN — CEPHALEXIN 500 MG: 500 CAPSULE ORAL at 21:22

## 2020-08-12 NOTE — DISCHARGE INSTRUCTIONS
Dilation and Curettage   WHAT YOU SHOULD KNOW:   Dilation and curettage (D and C) is a procedure to remove tissue from the lining of your uterus  INSTRUCTIONS:   Medicines:   · Pain medicine: You may be given medicine to take away or decrease pain  Do not wait until the pain is severe before you take your medicine  · Antibiotics: This medicine will help fight or prevent an infection  · Take your medicine as directed  Call your healthcare provider if you think your medicine is not helping or if you have side effects  Tell him if you are allergic to any medicine  Keep a list of the medicines, vitamins, and herbs you take  Include the amounts, and when and why you take them  Bring the list or the pill bottles to follow-up visits  Carry your medicine list with you in case of an emergency  Follow up with your healthcare provider as directed:  Write down your questions so you remember to ask them during your visits  Activity:  Ask your primary healthcare provider when you can return to your normal activities  Contact your primary healthcare provider if:   · You have foul-smelling vaginal discharge  · You do not get your monthly period  · You feel depressed or anxious  · You feel very tired and weak  · You have questions or concerns about your condition or care  Return to the emergency department if:   · You have heavy vaginal bleeding that soaks 1 pad in 1 hour for 2 hours in a row  · You have a fever and abdominal cramps  · Your pain does not get better, even after treatment  © 2014 [employer] is for End User's use only and may not be sold, redistributed or otherwise used for commercial purposes  All illustrations and images included in CareNotes® are the copyrighted property of A D A M , Inc  or Shar Nguyen  The above information is an  only  It is not intended as medical advice for individual conditions or treatments   Talk to your doctor, nurse or pharmacist before following any medical regimen to see if it is safe and effective for you

## 2020-08-12 NOTE — DISCHARGE INSTR - AVS FIRST PAGE
Post Op Instructions for Patients having a Dilation and Evacuation  1  I encourage you to have someone with you for 24 hrs after your procedure  You are not allowed to drive or sign important papers during this time  2  If you are given antibiotics then please finish taking all the antibiotics that have been prescribed to you  You are to take Keflex 500 mg twice a day and Flaggyl 500 mg twice a day for 7 days  Your first dose will be given prior to leaving our hospital    3  If your blood type is Rh negative you will get a Rhogam injection prior to your discharge  This dose may not be needed if your partner is Rh negative and you have written proof we can copy or you have had a recent dose of Rhogam in the last 3 weeks  4  Please avoid intercourse until your bleeding stops and your pelvic discomfort has resolved  5  Please avoid using tampons until your next period  6  Motrin or Advil over the counter can be taken for uterine cramping  Please follow directions found on the bottle  7  Please watch for signs of foul smelling discharge, worsening abdominal pain, heavy bleeding (3 maxipads in 1 hr that is not resolving), light headedness, fever, chills, and difficulty with voiding or with bowel movements  8  You may notice some firmness, tenderness or moveable lumps in your breasts after your procedure  This is a normal process after a pregnancy has ended  Cold compresses, a tight bra, Tylenol or Motrin can help with the discomfort which will resolve over time  Avoid warm compresses or any other nipple stimulation  If the breasts are red and inflamed or the nipples develop a pus- like discharge or you develop fevers and chills, then this may be the start of an infection  9  Please call [phone] between 8 am and 4:30 pm if you have any questions or complications  Please call [phone] if you have any questions or complications after hours     10  Please make a 3-6 week follow up visit with your local obstetrician if your post op care is uncomplicated  Please call me for any complications prior to your routine follow up visit

## 2020-08-12 NOTE — OP NOTE
OPERATIVE REPORT  PATIENT NAME: Yojana Lawson    :  1985  MRN: [id number]  Pt Location:  OR ROOM 08    SURGERY DATE: 2020    Surgeon(s) and Role:     * Bridgett Gan MD - Primary     * Daisy Cross MD - Assisting    Preop Diagnosis:  Fetal demise before 22 weeks with retention of dead fetus [O36 4XX0]    Post-Op Diagnosis Codes:     * Fetal demise before 22 weeks with retention of dead fetus [O36 4XX0]    Procedure(s) (LRB):  DILATATION AND EVACUATION (D&E) 22 weeks (fetus measuring at 18 wks) (N/A)    Specimen(s):  ID Type Source Tests Collected by Time Destination   1 : genetic testing  gestational weeks= Tissue Products of Conception CHROMOSOME ANALYSIS, PRODUCTS OF CONCEPTION Bridgett Gan MD 2020    2 : gestational weeks=22 Tissue Products of Conception TISSUE EXAM Bridgett Gan MD 2020        Estimated Blood Loss:  300cc    Drains:  * No LDAs found *    Anesthesia Type:   General    Operative Indications:  Fetal demise before 22 weeks with retention of dead fetus [O36 4XX0]      Operative Findings: At the time of the removal of the fetus there was an odor suspicious for an infection  The fetus had nml hands and feet and was female  No malformations were detected but the exam was limited due to the traumatized tissues that occurred by the procedure  Complications:   None    Procedure and Technique:        Operation: Dilatation of cervix evacuation of products of conception    Findings as follows:  Exam under anesthesia   External genitalia: Normal  Vagina: Normal   Cervix:  Normal  She was 3 cm dilated after 6 laminaria were removed  She had passed the sponge at home  Uterus: Enlarged 18 week size, anterverted  Adnexa: Nonpalpable      Description of Operation:  The patient was identified and the procedure verified  A time out was completed  The patient was given general anesthesia, and placed in a modified dorsal lithotomy position     The patient was examined under anesthesia with the above findings noted  The patient was then prepped and draped in the normal sterile fashion  A weighted speculum was placed in the posterior fourchette of the vagina and a retractor raised the bladder so that the cervix could be visualized and the anterior lip of the cervix was grasped with a ringed forcep  She was already dilated up to a 43 Neely dilator  Ultrasound guidance was used for all instrumentation of the uterine cavity  Suction curette was used to rupture membranes and remove the amniotic fluid  IV pitocin was started to contract the uterus and lessen any bleeding  Sopher/ Biers clamps were used under US guidance to remove the products of conception  All extremities, torso and cranial contents were verified  Suction curettage was reinitiated to remove any remaining placenta  A thin endometrial stripe was seen by ultraound  A gentle sharp curette was used in a circumferential manner and the normal gritty texture of the uterine wall was appreciated proving there was no further placenta remaining  The fetal  tissue was collected and sent to pathology for confirmation of fetal tissue  A sample of fetal tissue ( heart and lungs and placenta) were sent for microarray  There was no evidence of uterine perforation  All instruments were then removed from the patient's uterus and vagina  The patient tolerated the procedure well and was sent to the Recovery Room in stable condition  Recent Blood type and CBC was   She had preop laminaria that required antibiotic prophylaxis with Doxycycline 100 mg bid that started with her laminaria placement  She was also given doxycycline 200 milligrams preop  Will started on Keflex 500 mg bid and Flaggyl 500 mg bid for 7 days for possible early infection       Attestation: I was present and scrubbed for the entire procedure         I was present for the entire procedure    Patient Disposition:  PACU     SIGNATURE: Kamlesh Browne MD  DATE: August 12, 2020  TIME: 7:48 PM

## 2020-08-12 NOTE — ANESTHESIA PREPROCEDURE EVALUATION
Procedure:  DILATATION AND EVACUATION (D&E) 22 weeks (fetus measuring at 18 wks) (N/A Uterus)    Relevant Problems   ANESTHESIA (within normal limits)      CARDIO   (+) Chronic hypertension in obstetric context in second trimester      ENDO (within normal limits)      GI/HEPATIC (within normal limits)      /RENAL (within normal limits)      GYN   (+) Multigravida of advanced maternal age in second trimester (Fetal demise  )      HEMATOLOGY (within normal limits)      MUSCULOSKELETAL (within normal limits)      NEURO/PSYCH (within normal limits)      PULMONARY (within normal limits)        Physical Exam    Airway    Mallampati score: II  TM Distance: >3 FB  Neck ROM: full     Dental   No notable dental hx     Cardiovascular      Pulmonary      Other Findings        Anesthesia Plan  ASA Score- 2     Anesthesia Type- general with ASA Monitors  Additional Monitors:   Airway Plan:           Plan Factors-    Chart reviewed  Existing labs reviewed  Induction-     Postoperative Plan- Plan for postoperative opioid use  Informed Consent- Anesthetic plan and risks discussed with patient  I personally reviewed this patient with the CRNA  Discussed and agreed on the Anesthesia Plan with the CRNA  Cami Blair

## 2020-08-12 NOTE — PROGRESS NOTES
Patient called informing us that Adela Mckeon was require for procedures [phone] D & E), [id number] (microrray), I called insurance and spoke to Renu burnette at case management and she said that Adela Mckeon was not required for those procedure then she transferred to [facility] to make sure that they were a cover benefit and per [facility] they are cover benefits and Adela Mckeon is not require reference # [id number]  I called and spoke to patient and informed her about it  I also asked her to check and make sure thatkang Mc falls under Tear 1 because if not she'll have to pay a high deductible, she said that she did check with her insurance and that they did tell her that [address] is a Tear 1

## 2020-08-12 NOTE — ANESTHESIA POSTPROCEDURE EVALUATION
Post-Op Assessment Note    CV Status:  Stable  Pain Score: 0    Pain management: adequate     Mental Status:  Alert and awake   Hydration Status:  Euvolemic   PONV Controlled:  Controlled   Airway Patency:  Patent      Post Op Vitals Reviewed: Yes      Staff: CRNA, Anesthesiologist         No complications documented      BP  154/74   Temp   97 9   Pulse  71   Resp   20   SpO2   99

## 2020-08-13 VITALS
WEIGHT: 140 LBS | BODY MASS INDEX: 21.22 KG/M2 | OXYGEN SATURATION: 96 % | HEIGHT: 68 IN | TEMPERATURE: 99 F | HEART RATE: 78 BPM | RESPIRATION RATE: 16 BRPM | DIASTOLIC BLOOD PRESSURE: 95 MMHG | SYSTOLIC BLOOD PRESSURE: 154 MMHG

## 2020-08-13 LAB
B19V IGG SER IA-ACNC: 6.5 INDEX (ref 0–0.8)
B19V IGM SER IA-ACNC: 0.2 INDEX (ref 0–0.8)
CARDIOLIPIN IGA SER IA-ACNC: <9 APL U/ML (ref 0–11)
CARDIOLIPIN IGG SER IA-ACNC: <9 GPL U/ML (ref 0–14)
CARDIOLIPIN IGM SER IA-ACNC: <9 MPL U/ML (ref 0–12)
CMV IGM SERPL IA-ACNC: <30 AU/ML (ref 0–29.9)
COMMENT 1 FOR TOXO IGM QNT: NORMAL
CREAT UR-MCNC: <13 MG/DL
HSV1+2 IGM SER IA-ACNC: <0.91 RATIO (ref 0–0.9)
MAGNESIUM SERPL-MCNC: 1.8 MG/DL (ref 1.6–2.6)
POTASSIUM SERPL-SCNC: 3.1 MMOL/L (ref 3.5–5.3)
PROT UR-MCNC: 257 MG/DL
PROT/CREAT UR: >19.77 MG/G{CREAT} (ref 0–0.1)
RUBV IGM SER IA-ACNC: <20 AU/ML (ref 0–19.9)
T GONDII IGM SER IA-ACNC: <3 AU/ML (ref 0–7.9)

## 2020-08-13 PROCEDURE — 99024 POSTOP FOLLOW-UP VISIT: CPT | Performed by: OBSTETRICS & GYNECOLOGY

## 2020-08-13 RX ORDER — POTASSIUM CHLORIDE 20 MEQ/1
20 TABLET, EXTENDED RELEASE ORAL ONCE
Status: COMPLETED | OUTPATIENT
Start: 2020-08-13 | End: 2020-08-13

## 2020-08-13 RX ORDER — POTASSIUM CHLORIDE 20 MEQ/1
20 TABLET, EXTENDED RELEASE ORAL DAILY
Qty: 7 TABLET | Refills: 1 | Status: SHIPPED | OUTPATIENT
Start: 2020-08-13 | End: 2020-09-23 | Stop reason: ALTCHOICE

## 2020-08-13 RX ADMIN — POTASSIUM CHLORIDE 20 MEQ: 1500 TABLET, EXTENDED RELEASE ORAL at 00:43

## 2020-08-13 NOTE — PROGRESS NOTES
Conner Joya required 2 doses of Hydralyzine 10 mg each in pacu to control BP  She was started on Procardia 30 xl in this pregnancy for elevated BP and had a dose this am  She swears this is due to white coat hypertension  She denies symptoms of preeclampsia  I recommended observation overnight and preeclamptic labs  She is agreeable to preeclamptic labs but so far refuses to stay as she states this will make her bp go even higher  The majority of time (greater then 50%) was spent on counseling and coordination of care of this patient and /or family member  Approximate face to face time was 15 minutes            Eunice Sharma MD

## 2020-08-13 NOTE — PERIOPERATIVE NURSING NOTE
Per Anesthesia, patient may transfer to floor if SBP<160   Patient's vss and she is denying pain at  This time

## 2020-08-13 NOTE — PROGRESS NOTES
Her repeat potassium was 3 1  I added a magnesium level which was normal at 1 8  She denies any use of diuretics  I reviewed her medication list and did not find a medication that seem to be associated with low potassium levels  I gave her Kdur 20 meq and ordered a prescription for her to  at home  A repeat blood pressure about 35 min after her Procardia dose was 175/95  A BPP blood pressure 90 minutes after her dose was 154/95  She again denies any signs or symptoms of preeclampsia  She reports she feels sure that her blood pressure elevations are secondary to anxiety and does not want to stay in the hospital   We discussed the option of signing out against medical advice but she was concerned about insurance coverage if she signed out [place]  She has a blood pressure cuff at home  I asked that she check her blood pressure tomorrow morning before she takes her normal Procardia dose 30 XL  Recommend she hold her Procardia dose in the morning if her blood pressure is 120/60 or lower  She has my cell phone number in case she has any questions  She is aware of signs of preeclampsia and when to contact her OB provider  I asked that she make an appointment with a PCP in the next 1-2 weeks to start following her for her hypertension and to assess her etiology behind her hypokalemia  Till then I recommend she check her blood pressures daily  Recommend she call me for her blood pressures of 160/110 or higher until she sees her PCP  Jenn Short MD    The majority of time (greater then 50%) was spent on counseling and coordination of care of this patient and /or family member  Approximate face to face time was 15 minutes

## 2020-08-13 NOTE — PLAN OF CARE
Problem: PAIN - ADULT  Goal: Verbalizes/displays adequate comfort level or baseline comfort level  Description: Interventions:  - Encourage patient to monitor pain and request assistance  - Assess pain using appropriate pain scale  - Administer analgesics based on type and severity of pain and evaluate response  - Implement non-pharmacological measures as appropriate and evaluate response  - Consider cultural and social influences on pain and pain management  - Notify physician/advanced practitioner if interventions unsuccessful or patient reports new pain  Outcome: Progressing     Problem: INFECTION - ADULT  Goal: Absence or prevention of progression during hospitalization  Description: INTERVENTIONS:  - Assess and monitor for signs and symptoms of infection  - Monitor lab/diagnostic results  - Monitor all insertion sites, i e  indwelling lines  Problem: SAFETY ADULT  Goal: Patient will remain free of falls  Description: INTERVENTIONS:  - Assess patient frequently for physical needs  -  Identify cognitive and physical deficits and behaviors that affect risk of falls    -  [place] fall precautions as indicated by assessment   - Educate patient/family on patient safety including physical limitations  - Instruct patient to call for assistance with activity based on assessment  - Modify environment to reduce risk of injury  - Consider OT/PT consult to assist with strengthening/mobility  Outcome: Progressing  Goal: Maintain or return to baseline ADL function  Description: INTERVENTIONS:  -  Assess patient's ability to carry out ADLs; assess patient's baseline for ADL function and identify physical deficits which impact ability to perform ADLs (bathing, care of mouth/teeth, toileting, grooming, dressing, etc )  - Assess/evaluate cause of self-care deficits   - Assess range of motion  - Assess patient's mobility; develop plan if impaired  - Assess patient's need for assistive devices and provide as appropriate  - Encourage maximum independence but intervene and supervise when necessary  - Involve family in performance of ADLs  - Assess for home care needs following discharge   - Consider OT consult to assist with ADL evaluation and planning for discharge  - Provide patient education as appropriate  Outcome: Progressing  Goal: Maintain or return mobility status to optimal level  Description: INTERVENTIONS:  - Assess patient's baseline mobility status (ambulation, transfers, stairs, etc )    - Identify cognitive and physical deficits and behaviors that affect mobility  - Identify mobility aids required to assist with transfers and/or ambulation (gait belt, sit-to-stand, lift, walker, cane, etc )  - [place] fall precautions as indicated by assessment  - Record patient progress and toleration of activity level on Mobility SBAR; progress patient to next Phase/Stage  - Instruct patient to call for assistance with activity based on assessment  - Consider rehabilitation consult to assist with strengthening/weightbearing, etc   Outcome: Progressing     Problem: DISCHARGE PLANNING  Goal: Discharge to home or other facility with appropriate resources  Description: INTERVENTIONS:  - Identify barriers to discharge w/patient and caregiver  - Arrange for needed discharge resources and transportation as appropriate  - Identify discharge learning needs (meds, wound care, etc   Problem: Knowledge Deficit  Goal: Patient/family/caregiver demonstrates understanding of disease process, treatment plan, medications, and discharge instructions  Description: Complete learning assessment and assess knowledge base    Interventions:  - Provide teaching at level of understanding  - Provide teaching via preferred learning methods  Outcome: Progressing     - Refer to Case Management Department for coordinating discharge planning if the patient needs post-hospital services based on physician/advanced practitioner order or complex needs related to functional status, cognitive ability, or social support system  Outcome: Progressing     - Monitor endotracheal if appropriate and nasal secretions for changes in amount and color  - [place] appropriate cooling/warming therapies per order  - Administer medications as ordered  - Instruct and encourage patient and family to use good hand hygiene technique  - Identify and instruct in appropriate isolation precautions for identified infection/condition  Outcome: Progressing  Goal: Absence of fever/infection during neutropenic period  Description: INTERVENTIONS:  - Monitor WBC    Outcome: Progressing

## 2020-08-14 LAB
B2 GLYCOPROT1 IGA SER-ACNC: <9 GPI IGA UNITS (ref 0–25)
B2 GLYCOPROT1 IGG SER-ACNC: <9 GPI IGG UNITS (ref 0–20)
B2 GLYCOPROT1 IGM SER-ACNC: <9 GPI IGM UNITS (ref 0–32)

## 2020-08-14 NOTE — DISCHARGE SUMMARY
Discharge- Khushbu Herr 1985, 29 y o  female MRN: [id number]    Unit/Bed#: -01 Encounter: [id number]    Primary Care Provider: No primary care provider on file  Date and time admitted to hospital: 8/12/2020  2:37 PM        No new Assessment & Plan notes have been filed under this hospital service since the last note was generated  Service: Maternal-Fetal Medicine              Resolved Problems  Date Reviewed: 7/22/2020    None          Observation Date:  8/12/20    Admitting Diagnosis: Fetal demise before 22 weeks with retention of dead fetus [O36 4XX0]    HPI:  Patient underwent a D&E without complication but post op she had severe range blood pressures that required hydralazine 10 milligrams IV x2 and then a separate oral dose of Procardia 30 XL before we can control her blood pressure to the range of less than 160/105  Prior to admission she was on Procardia 30 XL daily and taken her dose 1st thing in the morning on the day of surgery  Procedures Performed: none    Summary of Hospital Course:  During the time of her admission she had baseline preeclamptic labs drawn which were normal other than a potassium of 3 0 and on repeat was 3 1  She was started on Kdur 20 milliequivalents and was given a prescription to go home on this daily for 2 weeks and she was encouraged to seek care by her PCP to work this up  Her 1st potassium of 3 0 was drawn prior to surgery  Her med list was examined and did not find any medications that would cause of low potassium level  During her time of observation based on her elevated blood pressures I encouraged her to stay overnight for 24 hours to prove that her blood pressures were under control  She declined multiple times because she felt this was all due to her anxiety  Her  felt the same way  They both declined signing out against medical advice    We finally agreed that they would be able to be discharged once her blood pressures were less than 160/105 and that which she would check them routinely over the next 24 hours and if they return to greater than 160/110 then she would present to the nearest emergency room that has OB care which would be [place]  I reviewed multiple times the signs and symptoms of preeclampsia and the risks of being discharge with uncontrolled blood pressures which includes ecclampsia,  stroke, and maternal death  Significant Findings, Care, Treatment and Services Provided:  As per summary of hospital course    Complications:  Acute exacerbation of her hypertension    Condition at Discharge: stable         Discharge instructions/Information to patient and family:   See after visit summary for information provided to patient and family  Provisions for Follow-Up Care:  See after visit summary for information related to follow-up care and any pertinent home health orders  PCP: No primary care provider on file  Disposition: Home    Planned Readmission: No    Discharge Statement   I spent 30 minutes discharging the patient  This time was spent on the day of discharge  I had direct contact with the patient on the day of discharge  Additional documentation is required if more than 30 minutes were spent on discharge  Discharge Medications:  See after visit summary for reconciled discharge medications provided to patient and family        Jenn Short MD

## 2020-08-15 LAB
APTT SCREEN TO CONFIRM RATIO: 0.99 RATIO (ref 0–1.4)
CONFIRM APTT/NORMAL: 32.5 SEC (ref 0–55)
LA PPP-IMP: NORMAL
SCREEN APTT: 32.2 SEC (ref 0–51.9)
SCREEN DRVVT: 30.5 SEC (ref 0–47)
THROMBIN TIME: 16.7 SEC (ref 0–23)

## 2020-08-18 NOTE — SOCIAL WORK
Entry for : CM consulted by [facility], Bradley Diego, to f/u on  home for fetus  As per chart review pt identified a private disposition  As per consent form pt identified Ovi Davila [phone]  [facility]  As per director, Andrea Soliz, pt had informed them of procedure but the [facility] did not get a f/u call regarding the fetus  TC to pt  CM left  requesting return call  Latanya updated of same  TC received from pt  Pt will contact  home - [employer] to have the fetus picked up for services

## 2020-08-21 LAB
CELLS ANALYZED: 20
CELLS COUNTED AMN: 20
CELLS KARYOTYPED.TOTAL BLD/T: 2
CLINICAL CYTOGENETICIST SPEC: NORMAL
ISCN BAND LEVEL QL: 500
KARYOTYP BLD/T: NORMAL
KARYOTYP BLD/T: NORMAL
SPECIMEN SOURCE: NORMAL

## 2020-08-25 ENCOUNTER — TELEPHONE (OUTPATIENT)
Dept: PERINATAL CARE | Facility: CLINIC | Age: 35
End: 2020-08-25

## 2020-08-25 NOTE — TELEPHONE ENCOUNTER
I received a return call from Simba Vick regarding a referral for an endocrinologist after her fetal demise  She is requesting lab results from her hospital stay be faxed to the endocrinologist  She provided a fax number for them to be sent to  I faxed all pertinent labs to [address] [facility] endocrinology as requested

## 2020-08-25 NOTE — TELEPHONE ENCOUNTER
I received a message from Nancy Palencia on the nurse line stating that she was referred to an endocrinologist after her fetal demise  She states she is looking for labwork and paperwork to be sent to the endocrinologist  I attempted to reach her to find out which labs she needed to be faxed and where to fax them  I requested a call back

## 2020-09-08 LAB — SCAN RESULT: NORMAL

## 2020-09-14 ENCOUNTER — TELEPHONE (OUTPATIENT)
Dept: PERINATAL CARE | Facility: CLINIC | Age: 35
End: 2020-09-14

## 2020-09-14 NOTE — TELEPHONE ENCOUNTER
Left a message to call me to change the consult appointment from 9/23 at 10 am in [facility] to a virtual visit on 9/22 with Dr Filiberto Harris  I asked patient to call me to reschedule the consult

## 2020-09-15 ENCOUNTER — TELEPHONE (OUTPATIENT)
Dept: PERINATAL CARE | Facility: CLINIC | Age: 35
End: 2020-09-15

## 2020-09-15 NOTE — TELEPHONE ENCOUNTER
Rosie Larkin called back about her consult  She does not want a virtual appointment  She stated Dr Freda Wilson said she could meet her in person in [place] office  I changed her time to 11 am instead of 1015  She verbalized understanding of time date and location of appointment

## 2020-09-22 ENCOUNTER — TELEPHONE (OUTPATIENT)
Dept: PERINATAL CARE | Facility: CLINIC | Age: 35
End: 2020-09-22

## 2020-09-22 NOTE — TELEPHONE ENCOUNTER
Attempted to reach patient by phone and left voicemail to confirm appointment for MFM ultrasound  1 support person ( must be over the age of 25) may accompany you for your appointment  If you or your support person have traveled outside the state in the past 2 weeks, please call and notify our office today #[phone]  You and your support person must wear a mask ,covering nose and mouth,during your entire visit  You and your support person will have temperature screened upon arrival     To minimize your exposure in our waiting room, please call our office prior to entering the building  Check in and rooming questions will be done via phone  We will give you directions when to enter for your appointment  Inside office # provided:  [place] line: [phone]  [facility] line:  [phone]  [facility] line:  [phone] line:  [phone]  [facility] line:  [phone]  [place] line:  [phone]    IF you are not feeling well- cough, fever, shortness of breath or any flu like symptoms, contact your primary care physician or [phone]    Any questions with these instructions please call Maternal Fetal Medicine nurse line today @ # [phone]

## 2020-09-23 ENCOUNTER — CONSULT (OUTPATIENT)
Dept: PERINATAL CARE | Facility: CLINIC | Age: 35
End: 2020-09-23
Payer: COMMERCIAL

## 2020-09-23 DIAGNOSIS — I10 CHRONIC HYPERTENSION: ICD-10-CM

## 2020-09-23 PROCEDURE — 99024 POSTOP FOLLOW-UP VISIT: CPT | Performed by: OBSTETRICS & GYNECOLOGY

## 2020-09-23 NOTE — PROGRESS NOTES
Caty Grimes  Dear Dr Keturah Brittle     Thank you for previously referring your patient Niesha Friedman for the following indications:  22 week fetal demise    Chandan Lewis is a 12-year-old  4 para 2 012  She is following up today after a D&E for a 22 week 4 day fetal demise that measured 18 weeks and 1 day  Her risk factors include chronic hypertension and advanced maternal age  At the time of her D&E she had a postop complication of severe range blood pressures that required hydralazine 10 milligrams IV x2 and then a separate oral dose of Procardia 30 XL before we could control her blood pressure to the range of less than 160/105  At the time of her elevated bps she felt this was due to her anxiety and was convinced it would improve if she was discharged home  Looking at her protein creatinine ratio of 19 77 I see today that I do not remember seeing at the time of her admission but would go along with a diagnosis of superimposed preeclampsia  Prior to admission she was on Procardia 30 XL daily and had taken her dose 1st thing in the morning on the day of surgery  Postop she had baseline preeclamptic labs drawn which returned with a LD of 249, uric acid of 2 7, creatinine of 0 54, Nml LFTs and potassium of 3 0 and repeat was 3 1 and she was started on K-Dur 20 milliequivalents and was given a prescription to go home on this daily for 2 weeks and she was encouraged to seek care by her PCP to work this up  She had one prior pregnancy at term delivered at 37 weeks due to her hypertension in 2017  That baby weighed 5 pounds 4 ounces  She is following with Dr Horace Mendez from [facility] endocrinology and is in the process of being worked up for a adrenal tumor /hyperaldosteronism      Past Medical History:   Diagnosis Date    Abnormal Pap smear of cervix     ASC-H w/ +HPV    HTN (hypertension)       Past Surgical History:   Procedure Laterality Date    DILATION AND EVACUATION       x 2 (14 week missed ab and 22 week IUFD that measured 18 weeks)    [place] INDUCED ABORTN BY DIL/EVAC N/A 2020    Procedure: DILATATION AND EVACUATION (D&E) 22 weeks (fetus measuring at 18 wks); Surgeon: Bridgett Gan MD;  [facility];  Service: Gynecology     OB History    Para Term  AB Living   4 3 2 1 1 2   SAB TAB Ectopic Multiple Live Births   1 0 0 0 2      # Outcome Date GA Lbr Andrés/2nd Weight Sex Delivery Anes PTL Lv   4  20 22w4d   F   N FD      Birth Comments: Measured 18w1d at time of demise 55 XX  Delivered by D/E Developed uncontrolled bp with probable preeclampsia post partum but patient felt it was stress related   3 2019 14w2d             Birth Comments: missed ab and needed D/E   2 Term 17 37w0d  2381 g (5 lb 4 oz) M Vag-Spont  N CHRISTIANNE   1 Term 11/10/11 40w5d  3487 g (7 lb 11 oz) M Vag-Spont  N CHRISTIANNE      Obstetric Comments   IUFD @ 15 weeks       Current Outpatient Medications on File Prior to Visit   Medication Sig Dispense Refill    Cholecalciferol (VITAMIN D3) 125 MCG (5000 UT) CAPS Take 125 mcg by mouth daily      NIFEdipine ER (ADALAT CC) 30 MG 24 hr tablet Take 30 mg by mouth daily On empty stomach      Prenatal Vit-Fe Fumarate-FA (PRENATAL VITAMIN PO) Take 1 tablet by mouth daily       No current facility-administered medications on file prior to visit  Allergies   Allergen Reactions    Iodine Anaphylaxis    Doxycycline GI Intolerance     Family History   Problem Relation Age of Onset    No Known Problems Mother     Heart disease Father     No Known Problems Sister     No Known Problems Brother     No Known Problems Son     No Known Problems Son       Social History     Tobacco Use    Smoking status: Never Smoker    Smokeless tobacco: Never Used   Substance Use Topics    Alcohol use: Not Currently    Drug use: Never       Review of Systems   HENT: Negative  Eyes: Negative  Respiratory: Negative  Cardiovascular: Negative  Gastrointestinal: Negative  Endocrine: Negative  Genitourinary: Negative  Neurological: Negative  Hematological: Negative  Psychiatric/Behavioral: Negative  /76 (BP Location: Left arm, Patient Position: Sitting, Cuff Size: Standard)   Pulse 84   Temp 99 °F (37 2 °C) (Tympanic)   Ht 5' 8" (1 727 m)   Wt 65 2 kg (143 lb 12 8 oz)   LMP 02/28/2020   BMI 21 86 kg/m²         Follow up recommended:   Recommend she complete another protein creatinine ratio when she is 6 weeks post delivery to prove her proteinuria resolved  Await the results of her work up for hyperaldosteronism  In her next pregnancy recommend:    Baby aspirin 162 mg started around 14 weeks of pregnancy to lessen the risk to develop preeclampsia in a future pregnancy  Baseline preeclamptic labs early in her next pregnancy  An early dating scan and a 12 week scan for nuchal translucency, along with a 20 week anatomy scan and 28 and 34 week growth scan  Fetal testing with twice weekly nst and weekly henry from 32 weeks on and delivery after 39 weeks unless she requires earlier delivery due to uncontrolled hypertension or onset of preeclampsia  She will be 35 or older in her next pregnancy and should be offered genetic screening in the first trimester  Prenatal vitamins should be started 3 months preconceptually for the best benefit  The majority of time (greater then 50%) was spent on counseling and coordination of care of this patient and /or family member  Approximate face to face time was 30 minutes         Merle Cortez MD

## 2020-09-23 NOTE — LETTER
2020     [address]  [address]  [place] [address]    Patient: Khushbu Herr   YOB: 1985   Date of Visit: 2020       Dear Dr Carol Mendenhall: Thank you for referring Khushbu Herr to me for evaluation  Below are my notes for this consultation  If you have questions, please do not hesitate to call me  I look forward to following your patient along with you  Sincerely,        Perez Padilla MD        CC: MD Perez Barlow MD  2020  1:57 AM  Sign when Signing Visit  Mickey Meigs  Dear Dr Carol Mendenhall     Thank you for previously referring your patient Khushbu Herr for the following indications:  22 week fetal demise    Tati Ontiveros is a 40-year-old  4 para 2 012  She is following up today after a D&E for a 22 week 4 day fetal demise that measured 18 weeks and 1 day  Her risk factors include chronic hypertension and advanced maternal age  At the time of her D&E she had a postop complication of severe range blood pressures that required hydralazine 10 milligrams IV x2 and then a separate oral dose of Procardia 30 XL before we could control her blood pressure to the range of less than 160/105  At the time of her elevated bps she felt this was due to her anxiety and was convinced it would improve if she was discharged home  Looking at her protein creatinine ratio of 19 77 I see today that I do not remember seeing at the time of her admission but would go along with a diagnosis of superimposed preeclampsia  Prior to admission she was on Procardia 30 XL daily and had taken her dose 1st thing in the morning on the day of surgery    Postop she had baseline preeclamptic labs drawn which returned with a LD of 249, uric acid of 2 7, creatinine of 0 54, Nml LFTs and potassium of 3 0 and repeat was 3 1 and she was started on K-Dur 20 milliequivalents and was given a prescription to go home on this daily for 2 weeks and she was encouraged to seek care by her PCP to work this up  She had one prior pregnancy at term delivered at 37 weeks due to her hypertension in 2017  That baby weighed 5 pounds 4 ounces  She is following with Dr Westley Gómez from [facility] endocrinology and is in the process of being worked up for a adrenal tumor /hyperaldosteronism  Past Medical History:   Diagnosis Date    Abnormal Pap smear of cervix     ASC-H w/ +HPV    HTN (hypertension)       Past Surgical History:   Procedure Laterality Date    DILATION AND EVACUATION       x 2 (14 week missed ab and 22 week IUFD that measured 18 weeks)    [place] INDUCED ABORTN BY DIL/EVAC N/A 2020    Procedure: DILATATION AND EVACUATION (D&E) 22 weeks (fetus measuring at 18 wks); Surgeon: Luther Mccloud MD;  [facility];  Service: Gynecology     OB History    Para Term  AB Living   4 3 2 1 1 2   SAB TAB Ectopic Multiple Live Births   1 0 0 0 2      # Outcome Date GA Lbr Andrés/2nd Weight Sex Delivery Anes PTL Lv   4  20 22w4d   F   N FD      Birth Comments: Measured 18w1d at time of demise 55 XX  Delivered by D/E Developed uncontrolled bp with probable preeclampsia post partum but patient felt it was stress related   3 2019 14w2d             Birth Comments: missed ab and needed D/E   2 Term 17 37w0d  2381 g (5 lb 4 oz) M Vag-Spont  N CHRISTIANNE   1 Term 11/10/11 40w5d  3487 g (7 lb 11 oz) M Vag-Spont  N CHRISTIANNE      Obstetric Comments   IUFD @ 15 weeks       Current Outpatient Medications on File Prior to Visit   Medication Sig Dispense Refill    Cholecalciferol (VITAMIN D3) 125 MCG (5000 UT) CAPS Take 125 mcg by mouth daily      NIFEdipine ER (ADALAT CC) 30 MG 24 hr tablet Take 30 mg by mouth daily On empty stomach      Prenatal Vit-Fe Fumarate-FA (PRENATAL VITAMIN PO) Take 1 tablet by mouth daily       No current facility-administered medications on file prior to visit        Allergies   Allergen Reactions    Iodine Anaphylaxis    Doxycycline GI Intolerance     Family History   Problem Relation Age of Onset    No Known Problems Mother     Heart disease Father     No Known Problems Sister     No Known Problems Brother     No Known Problems Son     No Known Problems Son       Social History     Tobacco Use    Smoking status: Never Smoker    Smokeless tobacco: Never Used   Substance Use Topics    Alcohol use: Not Currently    Drug use: Never       Review of Systems   HENT: Negative  Eyes: Negative  Respiratory: Negative  Cardiovascular: Negative  Gastrointestinal: Negative  Endocrine: Negative  Genitourinary: Negative  Neurological: Negative  Hematological: Negative  Psychiatric/Behavioral: Negative  /76 (BP Location: Left arm, Patient Position: Sitting, Cuff Size: Standard)   Pulse 84   Temp 99 °F (37 2 °C) (Tympanic)   Ht 5' 8" (1 727 m)   Wt 65 2 kg (143 lb 12 8 oz)   LMP 02/28/2020   BMI 21 86 kg/m²         Follow up recommended:   Recommend she complete another protein creatinine ratio when she is 6 weeks post delivery to prove her proteinuria resolved  Await the results of her work up for hyperaldosteronism  In her next pregnancy recommend:    Baby aspirin 162 mg started around 14 weeks of pregnancy to lessen the risk to develop preeclampsia in a future pregnancy  Baseline preeclamptic labs early in her next pregnancy  An early dating scan and a 12 week scan for nuchal translucency, along with a 20 week anatomy scan and 28 and 34 week growth scan  Fetal testing with twice weekly nst and weekly henry from 32 weeks on and delivery after 39 weeks unless she requires earlier delivery due to uncontrolled hypertension or onset of preeclampsia  She will be 35 or older in her next pregnancy and should be offered genetic screening in the first trimester  Prenatal vitamins should be started 3 months preconceptually for the best benefit         The majority of time (greater then 50%) was spent on counseling and coordination of care of this patient and /or family member  Approximate face to face time was 30 minutes         Perez Padilla MD

## 2020-09-25 ENCOUNTER — TELEPHONE (OUTPATIENT)
Dept: PERINATAL CARE | Facility: CLINIC | Age: 35
End: 2020-09-25

## 2020-09-25 NOTE — TELEPHONE ENCOUNTER
Bambi Morris called in reference to a bill she received in the mail for her procedure/ microarray  She needed the reference # to help resolve this issue  I gave her the reference # and the names of the people listed per the documentation of Cullen Villalobos on 8/22/20  I also told her to call us if she had any further issues/questions to assist her in this issue

## 2020-09-27 VITALS
DIASTOLIC BLOOD PRESSURE: 76 MMHG | SYSTOLIC BLOOD PRESSURE: 136 MMHG | BODY MASS INDEX: 21.79 KG/M2 | TEMPERATURE: 99 F | WEIGHT: 143.8 LBS | HEIGHT: 68 IN | HEART RATE: 84 BPM

## 2020-09-27 PROBLEM — O36.4XX0 FETAL DEMISE, GREATER THAN 22 WEEKS, DELIVERED, CURRENT HOSPITALIZATION: Status: RESOLVED | Noted: 2020-08-12 | Resolved: 2020-09-27

## 2020-09-27 PROBLEM — Z3A.22 22 WEEKS GESTATION OF PREGNANCY: Status: RESOLVED | Noted: 2020-07-22 | Resolved: 2020-09-27

## 2020-09-28 PROBLEM — I10 CHRONIC HYPERTENSION: Status: ACTIVE | Noted: 2020-07-22

## 2020-09-28 PROBLEM — O09.522 MULTIGRAVIDA OF ADVANCED MATERNAL AGE IN SECOND TRIMESTER: Status: RESOLVED | Noted: 2020-07-22 | Resolved: 2020-09-28

## 2020-10-27 LAB
CREAT ?TM UR-SCNC: 154.9 UMOL/L
EXT PROTEIN URINE: 14.4
PROT/CREAT UR: 93 MG/G{CREAT}

## 2021-04-08 ENCOUNTER — TRANSCRIBE ORDERS (OUTPATIENT)
Dept: PERINATAL CARE | Facility: CLINIC | Age: 36
End: 2021-04-08

## 2021-04-08 DIAGNOSIS — O09.899 SUPERVISION OF OTHER HIGH RISK PREGNANCIES, UNSPECIFIED TRIMESTER: Primary | ICD-10-CM

## 2021-04-13 ENCOUNTER — TELEMEDICINE (OUTPATIENT)
Dept: PERINATAL CARE | Facility: CLINIC | Age: 36
End: 2021-04-13

## 2021-04-13 DIAGNOSIS — O09.291 PRIOR PREGNANCY WITH FETAL DEMISE AND CURRENT PREGNANCY IN FIRST TRIMESTER: ICD-10-CM

## 2021-04-13 DIAGNOSIS — Z31.5 ENCOUNTER FOR PROCREATIVE GENETIC COUNSELING: ICD-10-CM

## 2021-04-13 DIAGNOSIS — O09.529 ANTEPARTUM MULTIGRAVIDA OF ADVANCED MATERNAL AGE: Primary | ICD-10-CM

## 2021-04-13 PROCEDURE — NC001 PR NO CHARGE

## 2021-04-14 NOTE — PROGRESS NOTES
Genetic Counseling   High-Risk Gestation Note    Appointment Date:  2021  Referred By: Poonam Alonzo 10 Courtney Rubio  YOB: 1985  Partner:  Champ Dunn  Indication for Visit:  advanced maternal age  Pregnancy History:   Estimated Date of Delivery: 10/30/21  Estimated Gestational Age: 11w3d    Virtual Regular Visit      Assessment/Plan:    Problem List Items Addressed This Visit     None      Visit Diagnoses     Antepartum multigravida of advanced maternal age    -  Primary    Prior pregnancy with fetal demise and current pregnancy in first trimester        Encounter for procreative genetic counseling                   Reason for visit is   Chief Complaint   Patient presents with    Virtual Regular Visit        Encounter provider Yan Arnold    Provider located at [address] [place] [id number] [phone]      Recent Visits  No visits were found meeting these conditions  Showing recent visits within past 7 days and meeting all other requirements     Future Appointments  No visits were found meeting these conditions  Showing future appointments within next 150 days and meeting all other requirements        The patient was identified by name and date of birth  Byron Escobar was informed that this is a telemedicine visit and that the visit is being conducted through [employer] and patient was informed that this is a secure, HIPAA-compliant platform  She agrees to proceed     My office door was closed  No one else was in the room  She acknowledged consent and understanding of privacy and security of the video platform  The patient has agreed to participate and understands they can discontinue the visit at any time  Patient is aware this is a billable service  Chriss Hoff is a 28 y o  female who presented for genetic counseling to discuss maternal age related risks for chromosome abnormalities    The risk of Down syndrome at age 39 at delivery is 1/281, and the risk for any chromosomal abnormality at this age is 4/80  The risks, benefits, and limitations of amniocentesis were discussed with the patient  Amniocentesis is performed under direct real time ultrasound visualization to avoid both the fetus and the placenta  Once amniotic fluid is withdrawn, laboratory analysis is performed and amniotic fluid alpha-fetoprotein, as well as chromosome and/or microarray analysis is undertaken  The risk of genetic amniocentesis includes, but is not limited to less than 1 in 300 pregnancy loss rate or  delivery rate if 23 weeks or greater, infection, bleeding, rupture of membranes, failure of cells to grow, karyotype error, laboratory error, etc   Occasionally a repeat amniocentesis is necessary due to cell culture failure  Chromosome/microarray analysis from amniocentesis is 99 9% accurate and alpha-fetoprotein analysis can detect approximately 95% of open neural tube defects  Chorionic villus sampling (CVS) is another diagnostic testing option that is available earlier than amniocentesis, between 10-14 weeks gestation  Like amniocentesis, CVS is 99% accurate for detecting chromosomal problems  Unlike amniocentesis, CVS cannot detect alpha-fetoprotein levels in order to determine the risk for open neural tube defects  MSAFP testing would need to be performed at 15-20 weeks gestation for this purpose  The risk of CVS includes, but is not limited to, less than a 1 in 300 risk for pregnancy loss  There is also a 1% risk for maternal cell contamination and cell culture failure, in which case the CVS would need to be followed-up with amniocentesis  We reviewed the testing option of cell free fetal DNA screening (also known as noninvasive prenatal testing or NIPT)  We discussed that it is a serum test to identify fragments of fetal DNA in maternal blood    We reviewed the benefits and limitations of cell free fetal DNA screening in detecting Down syndrome, Trisomy 13, Trisomy 18 and sex chromosome aneuploidies  We also discussed that cell free fetal DNA screening does not detect additional chromosomal abnormalities and the possibility of a failed test result  As cell free fetal DNA screening does not detect open neural tube defects, MSAFP screening is available at 15-20 weeks gestation  We discussed the availability of an ultrasound between 11-14 weeks gestation to measure the nuchal translucency (NT), which can assess for chromosome abnormalities, cardiac defects, and other adverse pregnancy outcomes  We reviewed that level II anatomy ultrasound is typically performed at approximately 20 weeks gestation  Level II ultrasound evaluation is between 60-80% accurate in detecting major physical birth defects and variations in fetal development that may be associated with chromosome abnormalities  Level II ultrasound evaluation is not able to detect all birth defects or health problems  After discussing the available prenatal screening and testing options Thierry Rivera elected to pursue cell free fetal DNA screening  She was informed that the results will disclose the fetal sex and will be available in her MyChart to review  A [employer] Personal Care Device [id number] lab slip was mailed to the patient to be drawn at a [facility] lab  Results take approximately 7-10 days  The [id number] cost estimate information was also provided to the patient and she was encouraged to find out how much it would be prior to getting her blood drawn  The maximum out of pocket cost of $299 through the Every Mom Pledge program was also discussed with the patient  The patient declined CVS and amniocentesis secondary to procedural related complications    She may reconsider diagnostic testing should the cell free fetal DNA screening come back abnormal   Thierry Rivera is also planning on pursuing NT ultrasound, MSAFP screening and Level II ultrasound at the appropriate times  Histories for the patient and her partner's family were taken during our session  Thierry Rivera had a fetal demise at about 22 weeks gestation, which had a normal microarray analysis  No specific cause was determined for the loss  Thierry Rivera reports a maternal aunt with autism, etiology unknown  We reviewed the general population risk for a diagnosis of autism, which is estimated at approximately 1/  We also reviewed the possible etiologies of autism, including multifactorial and genetic  Autism may be secondary to a chromosomal abnormality or single gene disorder; a genetic cause can now be identified in up to approximately 35-40% of cases  However there are over 800 genes associated with autism that have been identified to date, making a prenatal diagnosis and risk assessment difficult without records on the affected individual   After reviewing the benefits and limitations of Fragile X carrier screening, Thierry Rivera declined testing  Further review of family history for the patient and her partner was noncontributory  The family history was not significant for other genetic diseases or disorders, intellectual disability, birth defects, fetal loss, or consanguinity  Patient reports being of English/Guamanian/Kinyarwanda/Angolan descent and that her  is of Polish/Guamanian/Kinyarwanda descent  She denies either of them having known Ashkenazi Oriental orthodox ancestry  The benefits and limitations of Cystic fibrosis (CF), Spinal muscular atrophy (SMA), hemoglobinopathy, and expanded carrier screening was discussed  The patient declined expanded carrier screening, but elected to pursue the others pending insurance approval   She will be contacted for her blood draw once approval is obtained      Lastly, we discussed the fact that everyone in the general population regardless of age, family history, or medical background has approximately a 3-5% risk of having a child with some type of congenital anomaly, genetic disease or intellectual disability  Currently there are no tests available to rule out all birth defects or health problems  Michael Pal was provided with our contact information  I encouraged her to call with any questions or concerns  Plan/Tests Ordered:  1) Patient declined CVS, amniocentesis, Fragile X and expanded carrier screening  2) Patient elected NIPT - [id number] labslip mailed to patient  3) Patient elected CF and SMA carrier screening pending insurance approval   4) NT ultrasound scheduled for 4/28/21  5) MSAFP screening at 15-20 weeks gestation  6) Level II anatomy ultrasound at approximately 20 weeks gestation  HPI     Past Medical History:   Diagnosis Date    Abnormal Pap smear of cervix     ASC-H w/ +HPV    HTN (hypertension)        Past Surgical History:   Procedure Laterality Date    DILATION AND EVACUATION       x 2 (14 week missed ab and 22 week IUFD that measured 18 weeks)    [place] INDUCED ABORTN BY DIL/EVAC N/A 8/12/2020    Procedure: DILATATION AND EVACUATION (D&E) 22 weeks (fetus measuring at 18 wks); Surgeon: Marylen Bryant, MD;  [facility];  Service: Gynecology       Current Outpatient Medications   Medication Sig Dispense Refill    Cholecalciferol (VITAMIN D3) 125 MCG (5000 UT) CAPS Take 125 mcg by mouth daily      NIFEdipine ER (ADALAT CC) 30 MG 24 hr tablet Take 30 mg by mouth daily On empty stomach      Prenatal Vit-Fe Fumarate-FA (PRENATAL VITAMIN PO) Take 1 tablet by mouth daily       No current facility-administered medications for this visit  Allergies   Allergen Reactions    Iodine - Food Allergy Anaphylaxis    Doxycycline GI Intolerance       Review of Systems    Video Exam    There were no vitals filed for this visit  Physical Exam     I spent 45 minutes directly with the patient during this visit      VIRTUAL VISIT DISCLAIMER    Ryantravis Tiki acknowledges that she has consented to an online visit or consultation   She understands that the online visit is based solely on information provided by her, and that, in the absence of a face-to-face physical evaluation by the physician, the diagnosis she receives is both limited and provisional in terms of accuracy and completeness  This is not intended to replace a full medical face-to-face evaluation by the physician  Faye Fernandez understands and accepts these terms

## 2021-04-27 ENCOUNTER — TELEPHONE (OUTPATIENT)
Dept: PERINATAL CARE | Facility: CLINIC | Age: 36
End: 2021-04-27

## 2021-04-27 DIAGNOSIS — Z13.71 TESTING OF FEMALE FOR GENETIC DISEASE CARRIER STATUS: Primary | ICD-10-CM

## 2021-04-28 ENCOUNTER — ROUTINE PRENATAL (OUTPATIENT)
Dept: PERINATAL CARE | Facility: CLINIC | Age: 36
End: 2021-04-28
Payer: COMMERCIAL

## 2021-04-28 VITALS
BODY MASS INDEX: 21.28 KG/M2 | WEIGHT: 140.4 LBS | SYSTOLIC BLOOD PRESSURE: 162 MMHG | DIASTOLIC BLOOD PRESSURE: 92 MMHG | HEIGHT: 68 IN | HEART RATE: 90 BPM

## 2021-04-28 DIAGNOSIS — O99.281 HYPOTHYROIDISM IN PREGNANCY, ANTEPARTUM, FIRST TRIMESTER: ICD-10-CM

## 2021-04-28 DIAGNOSIS — O09.521 ELDERLY MULTIGRAVIDA, FIRST TRIMESTER: ICD-10-CM

## 2021-04-28 DIAGNOSIS — O10.911 MATERNAL CHRONIC HYPERTENSION, FIRST TRIMESTER: Primary | ICD-10-CM

## 2021-04-28 DIAGNOSIS — E03.9 HYPOTHYROIDISM IN PREGNANCY, ANTEPARTUM, FIRST TRIMESTER: ICD-10-CM

## 2021-04-28 DIAGNOSIS — O09.291 HX OF PREECLAMPSIA, PRIOR PREGNANCY, CURRENTLY PREGNANT, FIRST TRIMESTER: ICD-10-CM

## 2021-04-28 DIAGNOSIS — Z3A.13 13 WEEKS GESTATION OF PREGNANCY: ICD-10-CM

## 2021-04-28 DIAGNOSIS — O09.291 HISTORY OF STILLBIRTH IN PREGNANT PATIENT IN FIRST TRIMESTER, ANTEPARTUM: ICD-10-CM

## 2021-04-28 DIAGNOSIS — O09.291 HX OF INTRAUTERINE GROWTH RESTRICTION IN PRIOR PREGNANCY, CURRENTLY PREGNANT, FIRST TRIMESTER: ICD-10-CM

## 2021-04-28 PROCEDURE — 76813 OB US NUCHAL MEAS 1 GEST: CPT | Performed by: OBSTETRICS & GYNECOLOGY

## 2021-04-28 PROCEDURE — 99215 OFFICE O/P EST HI 40 MIN: CPT | Performed by: OBSTETRICS & GYNECOLOGY

## 2021-04-28 PROCEDURE — 76801 OB US < 14 WKS SINGLE FETUS: CPT | Performed by: OBSTETRICS & GYNECOLOGY

## 2021-04-28 RX ORDER — ASPIRIN 81 MG/1
81 TABLET, CHEWABLE ORAL DAILY
COMMUNITY
End: 2021-10-04

## 2021-04-28 RX ORDER — NIFEDIPINE 30 MG/1
30 TABLET, EXTENDED RELEASE ORAL DAILY
Qty: 60 TABLET | Refills: 2 | Status: SHIPPED | OUTPATIENT
Start: 2021-04-28 | End: 2021-10-09

## 2021-04-28 RX ORDER — LEVOTHYROXINE SODIUM 25 MCG
37.5 TABLET ORAL DAILY
COMMUNITY
Start: 2021-04-07 | End: 2021-10-04 | Stop reason: DRUGHIGH

## 2021-04-29 ENCOUNTER — DOCUMENTATION (OUTPATIENT)
Dept: PERINATAL CARE | Facility: CLINIC | Age: 36
End: 2021-04-29

## 2021-04-29 NOTE — PROGRESS NOTES
Call insurance to request auth for [id number] and was transferred to [facility] per Marylen Beavers is not require, reference # [id number] 4/29/21 11:59am

## 2021-05-01 PROBLEM — O09.291 HX OF PREECLAMPSIA, PRIOR PREGNANCY, CURRENTLY PREGNANT, FIRST TRIMESTER: Status: ACTIVE | Noted: 2021-05-01

## 2021-05-01 PROBLEM — O09.291: Status: ACTIVE | Noted: 2021-05-01

## 2021-05-01 PROBLEM — O10.911 MATERNAL CHRONIC HYPERTENSION, FIRST TRIMESTER: Status: ACTIVE | Noted: 2021-05-01

## 2021-05-01 PROBLEM — O09.291 HISTORY OF STILLBIRTH IN PREGNANT PATIENT IN FIRST TRIMESTER, ANTEPARTUM: Status: ACTIVE | Noted: 2021-05-01

## 2021-05-01 PROBLEM — O99.281 HYPOTHYROIDISM IN PREGNANCY, ANTEPARTUM, FIRST TRIMESTER: Status: ACTIVE | Noted: 2021-05-01

## 2021-05-01 PROBLEM — O09.521 ELDERLY MULTIGRAVIDA, FIRST TRIMESTER: Status: ACTIVE | Noted: 2021-05-01

## 2021-05-01 PROBLEM — E03.9 HYPOTHYROIDISM IN PREGNANCY, ANTEPARTUM, FIRST TRIMESTER: Status: ACTIVE | Noted: 2021-05-01

## 2021-05-01 NOTE — PROGRESS NOTES
Please refer to the [facility] ultrasound report in Ob Procedures for additional information regarding today's visit

## 2021-05-07 LAB
CF COMMENT: NORMAL
CFTR MUT ANL BLD/T: NORMAL
CLINICAL INFO: NORMAL
ETHNIC BACKGROUND STATED: NORMAL
GENE MUT TESTED BLD/T: NORMAL
GENERAL COMMENTS:: NORMAL
LAB DIRECTOR NAME PROVIDER: NORMAL
REASON FOR REFERRAL (NARRATIVE): NORMAL
REF LAB TEST METHOD: NORMAL
SL AMB DISCLAIMER: NORMAL
SL AMB GENETIC COUNSELOR: NORMAL
SMN1 GENE MUT ANL BLD/T: NORMAL
SPECIMEN SOURCE: NORMAL

## 2021-05-11 ENCOUNTER — TELEPHONE (OUTPATIENT)
Dept: PERINATAL CARE | Facility: CLINIC | Age: 36
End: 2021-05-11

## 2021-05-11 NOTE — TELEPHONE ENCOUNTER
Called patient and confirmed date of birth  Discussed negative SMA, and CF carrier screen  Reviewed that this result reduces but does not eliminate the risk to the pregnancy  Patient expressed verbal understanding and had no questions at this time

## 2021-06-21 ENCOUNTER — ROUTINE PRENATAL (OUTPATIENT)
Dept: PERINATAL CARE | Facility: CLINIC | Age: 36
End: 2021-06-21
Payer: COMMERCIAL

## 2021-06-21 VITALS
DIASTOLIC BLOOD PRESSURE: 78 MMHG | WEIGHT: 147.4 LBS | HEIGHT: 68 IN | BODY MASS INDEX: 22.34 KG/M2 | SYSTOLIC BLOOD PRESSURE: 122 MMHG | HEART RATE: 84 BPM

## 2021-06-21 DIAGNOSIS — Z36.3 ENCOUNTER FOR ANTENATAL SCREENING FOR MALFORMATIONS: ICD-10-CM

## 2021-06-21 DIAGNOSIS — Z36.86 ENCOUNTER FOR ANTENATAL SCREENING FOR CERVICAL LENGTH: ICD-10-CM

## 2021-06-21 DIAGNOSIS — O09.522 AMA (ADVANCED MATERNAL AGE) MULTIGRAVIDA 35+, SECOND TRIMESTER: Primary | ICD-10-CM

## 2021-06-21 PROCEDURE — 76817 TRANSVAGINAL US OBSTETRIC: CPT | Performed by: OBSTETRICS & GYNECOLOGY

## 2021-06-21 PROCEDURE — 99213 OFFICE O/P EST LOW 20 MIN: CPT | Performed by: OBSTETRICS & GYNECOLOGY

## 2021-06-21 PROCEDURE — 76811 OB US DETAILED SNGL FETUS: CPT | Performed by: OBSTETRICS & GYNECOLOGY

## 2021-06-21 NOTE — PATIENT INSTRUCTIONS
Thank you for choosing us for your  care today  If you have any questions about your ultrasound or care, please do not hesitate to contact us or your primary obstetrician  Some general instructions for your pregnancy are:     Protect against coronavirus: Continue to practice social distancing, wear a mask, and wash your hands often  Pregnant women are increased risk of severe COVID  Notify your primary care doctor if you have any symptoms including cough, shortness of breath or difficulty breathing, fever, chills, muscle pain, sore throat, or loss of taste or smell  Pregnant women can receive the coronavirus vaccine   Exercise: Aim for 22 minutes per day (150 minutes per week) of regular exercise  Walking is great!  Nutrition: aim for calcium-rich and iron-rich foods as well as healthy sources of protein   Protect against the flu: get yourself and your entire household vaccinated against influenza  This will protect your baby   Learn about Preeclampsia: preeclampsia is a common, serious high blood pressure complication in pregnancy  A blood pressure of 660APHC (systolic or top number) or 57AFOV (diastolic or bottom number) is not normal and needs evaluation by your doctor   If you smoke, try to reduce how many cigarettes you smoke or try to quit completely  Do not vape   Other warning signs to watch out for in pregnancy or postpartum: chest pain, obstructed breathing or shortness of breath, seizures, thoughts of hurting yourself or your baby, bleeding, a painful or swollen leg, fever, or headache (see AWHONN POST-BIRTH Warning Signs campaign)  If these happen call 911  Itching is also not normal in pregnancy and if you experience this, especially over your hands and feet, potentially worse at night, notify your doctors     Lastly, if you are contacted regarding participation in a survey about your experience in our office, please know that we take any feedback you provide seriously and use it to improve how we deliver care through our center

## 2021-06-21 NOTE — PROGRESS NOTES
Ultrasound Probe Disinfection    A transvaginal ultrasound was performed  Prior to use, disinfection was performed with High Level Disinfection Process (Trophon)  Probe serial number [id number]: [id number] was used        Monique Curiel  06/21/21  7:54 AM

## 2021-06-21 NOTE — PROGRESS NOTES
Conner Palencia: Ms Amelia Hughes was seen today at 21w2d for anatomic survey and cervical length screening ultrasound  See ultrasound report under "OB Procedures" tab  Please don't hesitate to contact our office with any concerns or questions    Stan Andersen MD

## 2021-06-21 NOTE — LETTER
June 21, 2021     [facility] [address] [address]    Patient: Christine Chin   YOB: 1985   Date of Visit: 6/21/2021       Dear Dominga Estevez: Thank you for referring Christine Chin to me for evaluation  Below are my notes for this consultation  Consultation is contained in body of ultrasound report which has been faxed to you under separate cover; please contact us if you do not receive this  If you have questions, please do not hesitate to call me  I look forward to following your patient along with you  Sincerely,        Ricardo Dee MD        CC: No Recipients  Ricardo Dee MD  6/21/2021  8:32 AM  Sign when Signing Visit  [facility]: Ms Ashley Dominguez was seen today at 21w2d for anatomic survey and cervical length screening ultrasound  See ultrasound report under "OB Procedures" tab  Please don't hesitate to contact our office with any concerns or questions    Ricardo Dee MD

## 2021-07-26 ENCOUNTER — ULTRASOUND (OUTPATIENT)
Dept: PERINATAL CARE | Facility: CLINIC | Age: 36
End: 2021-07-26
Payer: COMMERCIAL

## 2021-07-26 VITALS
HEART RATE: 92 BPM | HEIGHT: 68 IN | BODY MASS INDEX: 22.7 KG/M2 | WEIGHT: 149.8 LBS | DIASTOLIC BLOOD PRESSURE: 90 MMHG | SYSTOLIC BLOOD PRESSURE: 144 MMHG

## 2021-07-26 DIAGNOSIS — O09.292 HISTORY OF STILLBIRTH IN PREGNANT PATIENT IN SECOND TRIMESTER, ANTEPARTUM: ICD-10-CM

## 2021-07-26 DIAGNOSIS — Z36.89 ENCOUNTER FOR ULTRASOUND TO CHECK FETAL GROWTH: ICD-10-CM

## 2021-07-26 DIAGNOSIS — O10.912 CHRONIC HYPERTENSION IN OBSTETRIC CONTEXT IN SECOND TRIMESTER: ICD-10-CM

## 2021-07-26 DIAGNOSIS — O09.292 HX OF PREECLAMPSIA, PRIOR PREGNANCY, CURRENTLY PREGNANT, SECOND TRIMESTER: Primary | ICD-10-CM

## 2021-07-26 DIAGNOSIS — O09.292 HX OF INTRAUTERINE GROWTH RESTRICTION IN PRIOR PREGNANCY, CURRENTLY PREGNANT, SECOND TRIMESTER: ICD-10-CM

## 2021-07-26 PROCEDURE — 76816 OB US FOLLOW-UP PER FETUS: CPT | Performed by: OBSTETRICS & GYNECOLOGY

## 2021-07-26 PROCEDURE — 99213 OFFICE O/P EST LOW 20 MIN: CPT | Performed by: OBSTETRICS & GYNECOLOGY

## 2021-07-26 NOTE — PROGRESS NOTES
[facility]: Ms Edson López was seen today at 26w2d for fetal growth assessment ultrasound  See ultrasound report under "OB Procedures" tab  Please don't hesitate to contact our office with any concerns or questions    Lyndsey Borden MD

## 2021-07-26 NOTE — LETTER
July 26, 2021     David Tucker, [address] [address]    Patient: Teodoro Laird   YOB: 1985   Date of Visit: 7/26/2021       Dear Filipe Guillen: Thank you for referring Teodoro Laird to me for evaluation  Below are my notes for this consultation  If you have questions, please do not hesitate to call me  I look forward to following your patient along with you  Sincerely,        Caroline Nazario MD        CC: No Recipients  Caroline Nazario MD  7/26/2021  1:28 PM  Sign when Signing Visit  [facility]: Ms Porter Cea was seen today at 26w2d for fetal growth assessment ultrasound  See ultrasound report under "OB Procedures" tab  Please don't hesitate to contact our office with any concerns or questions    Caroline Nazario MD

## 2021-07-26 NOTE — PATIENT INSTRUCTIONS
Thank you for choosing us for your  care today  If you have any questions about your ultrasound or care, please do not hesitate to contact us or your primary obstetrician  Some general instructions for your pregnancy are:     Protect against coronavirus: Continue to practice social distancing, wear a mask, and wash your hands often  Pregnant women are increased risk of severe COVID  Notify your primary care doctor if you have any symptoms including cough, shortness of breath or difficulty breathing, fever, chills, muscle pain, sore throat, or loss of taste or smell  Pregnant women can receive the coronavirus vaccine   Exercise: Aim for 22 minutes per day (150 minutes per week) of regular exercise  Walking is great!  Nutrition: aim for calcium-rich and iron-rich foods as well as healthy sources of protein   Protect against the flu: get yourself and your entire household vaccinated against influenza  This will protect your baby   Learn about Preeclampsia: preeclampsia is a common, serious high blood pressure complication in pregnancy  A blood pressure of 292MIKA (systolic or top number) or 66HOMT (diastolic or bottom number) is not normal and needs evaluation by your doctor   If you smoke, try to reduce how many cigarettes you smoke or try to quit completely  Do not vape   Other warning signs to watch out for in pregnancy or postpartum: chest pain, obstructed breathing or shortness of breath, seizures, thoughts of hurting yourself or your baby, bleeding, a painful or swollen leg, fever, or headache (see AWHONN POST-BIRTH Warning Signs campaign)  If these happen call 911  Itching is also not normal in pregnancy and if you experience this, especially over your hands and feet, potentially worse at night, notify your doctors     Lastly, if you are contacted regarding participation in a survey about your experience in our office, please know that we take any feedback you provide seriously and use it to improve how we deliver care through our center  Continue taking your aspirin 162mg daily for prevention of preeclampsia  If you have asthma and notice an increase in symptom frequency or severity, consider stopping this medication  If you have had bariatric surgery, you may need a different dose of medication with or without acid-reducing medication  We advise routine discontinuation of this medication at 36 weeks

## 2021-07-30 ENCOUNTER — TELEPHONE (OUTPATIENT)
Dept: PERINATAL CARE | Facility: CLINIC | Age: 36
End: 2021-07-30

## 2021-07-30 NOTE — TELEPHONE ENCOUNTER
Spoke with patient and confirmed her MFM appointment had to be rescheduled  Patient verbalized understanding of new time, date and location of appointment - 9/1/21  1:45  [facility]  Patient denies further questions

## 2021-09-01 ENCOUNTER — ULTRASOUND (OUTPATIENT)
Dept: PERINATAL CARE | Facility: CLINIC | Age: 36
End: 2021-09-01
Payer: COMMERCIAL

## 2021-09-01 VITALS
HEIGHT: 68 IN | SYSTOLIC BLOOD PRESSURE: 152 MMHG | WEIGHT: 156.8 LBS | DIASTOLIC BLOOD PRESSURE: 97 MMHG | HEART RATE: 80 BPM | BODY MASS INDEX: 23.76 KG/M2

## 2021-09-01 DIAGNOSIS — O10.913 CHRONIC HYPERTENSION IN OBSTETRIC CONTEXT IN THIRD TRIMESTER: ICD-10-CM

## 2021-09-01 DIAGNOSIS — O09.293 HX OF PREECLAMPSIA, PRIOR PREGNANCY, CURRENTLY PREGNANT, THIRD TRIMESTER: ICD-10-CM

## 2021-09-01 DIAGNOSIS — O99.283 HYPOTHYROIDISM IN PREGNANCY, ANTEPARTUM, THIRD TRIMESTER: ICD-10-CM

## 2021-09-01 DIAGNOSIS — O09.293: ICD-10-CM

## 2021-09-01 DIAGNOSIS — O09.522 AMA (ADVANCED MATERNAL AGE) MULTIGRAVIDA 35+, SECOND TRIMESTER: ICD-10-CM

## 2021-09-01 DIAGNOSIS — Z3A.31 31 WEEKS GESTATION OF PREGNANCY: ICD-10-CM

## 2021-09-01 DIAGNOSIS — E03.9 HYPOTHYROIDISM IN PREGNANCY, ANTEPARTUM, THIRD TRIMESTER: ICD-10-CM

## 2021-09-01 DIAGNOSIS — O09.293 HISTORY OF STILLBIRTH IN PREGNANT PATIENT IN THIRD TRIMESTER, ANTEPARTUM: Primary | ICD-10-CM

## 2021-09-01 PROCEDURE — 99214 OFFICE O/P EST MOD 30 MIN: CPT | Performed by: OBSTETRICS & GYNECOLOGY

## 2021-09-01 PROCEDURE — 76816 OB US FOLLOW-UP PER FETUS: CPT | Performed by: OBSTETRICS & GYNECOLOGY

## 2021-09-01 NOTE — PROGRESS NOTES
Robert Rodriguez reports she continues to develop anxiety with her OB visits and ultrasound visits and reports that her blood pressures at home and her pulse are usually in the range of 116/71 and with a pulse of 59 and today's blood pressure is 152/97 and a pulse of 80 in our office  She states she is still on Procardia 30 XL and did not need a another script because her pharmacy gave her a 90 day supply instead of 30 days for 1 of the months by mistake  She reports regular fetal movements and does not report any problems  She is here today at 31w4d for an ultrasound for growth  Her risk factors include hypothyroidism, hx of a 22 week stillbirth complicated by suspected super imposed preeclampsia, maternal age of 28 and chronic hypertension versus white coat hypertension  At her 20 week ultrasound she was given the range of possible delivery dates between 37 weeks and 0 days to 39 weeks and 6 days and she would like to be delivered at 37+ weeks  Ultrasound findings: The ultrasound today shows normal interval fetal growth and fluid  Pregnancy ultrasound has limitations and is unable to detect all forms of fetal congenital abnormalities  The inaccuracy in the EFW can be off by 1 lb either way in the third trimester  Specific counseling was provided on the following problems:  1  We discussed that early delivery can be associated with a possible increased risk for fetal lung immaturity, elevated bilirubin levels,  Temperature intolerance and poor feeding etc   These risks decrease with increasing gestational age and are minimal at 44 weeks  Follow up recommended:   1  Recommend a follow-up ultrasound for fetal growth in 4 weeks  2  Encouraged her to continue her monitoring of her blood pressures at home  3    She reports she is set up to start her twice weekly nonstress tests and weekly fluid scans through her OB office next week      Pre visit time reviewing her records   5 minutes  Face to face time 10 minutes  Post visit time on documentation of note, updating her problem list, adding orders and prescriptions 5 minutes  Procedures that were completed today were charged separately  The level of decision making was low level complexity      Teresa Demarco MD

## 2021-09-01 NOTE — LETTER
September 2, 2021     Freda Schneider, [address] [address]    Patient: Kerri Padilla   YOB: 1985   Date of Visit: 9/1/2021       Dear Dr Laquita Grimm: Thank you for referring Kerri Padilla to me for evaluation  Below are my notes for this consultation  If you have questions, please do not hesitate to call me  I look forward to following your patient along with you  Sincerely,        Mary Greenwood MD        CC: No Recipients  Mary Greenwood MD  9/2/2021  2:16 AM  Sign when Signing Visit  Kerri Padilla reports she continues to develop anxiety with her OB visits and ultrasound visits and reports that her blood pressures at home and her pulse are usually in the range of 116/71 and with a pulse of 59 and today's blood pressure is 152/97 and a pulse of 80 in our office  She states she is still on Procardia 30 XL and did not need a another script because her pharmacy gave her a 90 day supply instead of 30 days for 1 of the months by mistake  She reports regular fetal movements and does not report any problems  She is here today at 31w4d for an ultrasound for growth  Her risk factors include hypothyroidism, hx of a 22 week stillbirth complicated by suspected super imposed preeclampsia, maternal age of 28 and chronic hypertension versus white coat hypertension  At her 20 week ultrasound she was given the range of possible delivery dates between 37 weeks and 0 days to 39 weeks and 6 days and she would like to be delivered at 37+ weeks  Ultrasound findings: The ultrasound today shows normal interval fetal growth and fluid  Pregnancy ultrasound has limitations and is unable to detect all forms of fetal congenital abnormalities  The inaccuracy in the EFW can be off by 1 lb either way in the third trimester  Specific counseling was provided on the following problems:  1    We discussed that early delivery can be associated with a possible increased risk for fetal lung immaturity, elevated bilirubin levels,  Temperature intolerance and poor feeding etc   These risks decrease with increasing gestational age and are minimal at 44 weeks  Follow up recommended:   1  Recommend a follow-up ultrasound for fetal growth in 4 weeks  2  Encouraged her to continue her monitoring of her blood pressures at home  3    She reports she is set up to start her twice weekly nonstress tests and weekly fluid scans through her OB office next week  Pre visit time reviewing her records   5 minutes  Face to face time 10 minutes  Post visit time on documentation of note, updating her problem list, adding orders and prescriptions 5 minutes  Procedures that were completed today were charged separately  The level of decision making was low level complexity      Goldie Solomon MD

## 2021-10-04 ENCOUNTER — ULTRASOUND (OUTPATIENT)
Dept: PERINATAL CARE | Facility: CLINIC | Age: 36
End: 2021-10-04
Payer: COMMERCIAL

## 2021-10-04 VITALS
HEART RATE: 82 BPM | BODY MASS INDEX: 24.1 KG/M2 | HEIGHT: 68 IN | DIASTOLIC BLOOD PRESSURE: 80 MMHG | WEIGHT: 159 LBS | SYSTOLIC BLOOD PRESSURE: 130 MMHG

## 2021-10-04 DIAGNOSIS — Z3A.36 36 WEEKS GESTATION OF PREGNANCY: ICD-10-CM

## 2021-10-04 DIAGNOSIS — Z36.89 ENCOUNTER FOR ULTRASOUND TO ASSESS FETAL GROWTH: ICD-10-CM

## 2021-10-04 DIAGNOSIS — O09.293: ICD-10-CM

## 2021-10-04 DIAGNOSIS — O09.522 AMA (ADVANCED MATERNAL AGE) MULTIGRAVIDA 35+, SECOND TRIMESTER: ICD-10-CM

## 2021-10-04 DIAGNOSIS — O10.913 CHRONIC HYPERTENSION IN OBSTETRIC CONTEXT IN THIRD TRIMESTER: Primary | ICD-10-CM

## 2021-10-04 PROCEDURE — 99213 OFFICE O/P EST LOW 20 MIN: CPT | Performed by: OBSTETRICS & GYNECOLOGY

## 2021-10-04 PROCEDURE — 76816 OB US FOLLOW-UP PER FETUS: CPT | Performed by: OBSTETRICS & GYNECOLOGY

## 2021-10-04 RX ORDER — LORATADINE 10 MG/1
CAPSULE, LIQUID FILLED ORAL EVERY 24 HOURS
COMMUNITY

## 2021-10-04 RX ORDER — LEVOTHYROXINE SODIUM 50 MCG
50 TABLET ORAL DAILY
COMMUNITY
Start: 2021-09-30

## 2023-03-22 LAB
EXTERNAL HIV SCREEN: NORMAL
HCV AB SER-ACNC: NON REACTIVE

## 2024-12-12 LAB — HBA1C MFR BLD HPLC: 5.8 %

## 2024-12-17 ENCOUNTER — TRANSCRIBE ORDERS (OUTPATIENT)
Dept: PERINATAL CARE | Facility: OTHER | Age: 39
End: 2024-12-17

## 2024-12-17 DIAGNOSIS — O09.899 SUPERVISION OF OTHER HIGH RISK PREGNANCY, ANTEPARTUM: Primary | ICD-10-CM

## 2025-08-20 ENCOUNTER — RESULTS FOLLOW-UP (OUTPATIENT)
Age: 40
End: 2025-08-20

## 2025-08-20 DIAGNOSIS — E03.9 HYPOTHYROIDISM, UNSPECIFIED TYPE: Primary | ICD-10-CM

## 2025-08-20 LAB — TSH SERPL DL<=0.005 MIU/L-ACNC: 0.29 UIU/ML (ref 0.45–4.5)

## 2025-08-20 RX ORDER — LEVOTHYROXINE SODIUM 62.5 UG/1
62.5 CAPSULE ORAL DAILY
Qty: 30 CAPSULE | Refills: 2 | Status: SHIPPED | OUTPATIENT
Start: 2025-08-20

## (undated) DEVICE — SPONGE LAP 18 X 18 IN

## (undated) DEVICE — Device

## (undated) DEVICE — BETHLEHEM UNIVERSAL MINOR VAG: Brand: CARDINAL HEALTH

## (undated) DEVICE — PVC URETHRAL CATHETER: Brand: DOVER

## (undated) DEVICE — GLOVE SRG BIOGEL ECLIPSE 6.5

## (undated) DEVICE — GLOVE INDICATOR PI UNDERGLOVE SZ 6.5 BLUE